# Patient Record
Sex: FEMALE | Race: WHITE | NOT HISPANIC OR LATINO | Employment: OTHER | URBAN - METROPOLITAN AREA
[De-identification: names, ages, dates, MRNs, and addresses within clinical notes are randomized per-mention and may not be internally consistent; named-entity substitution may affect disease eponyms.]

---

## 2020-12-03 ENCOUNTER — HOSPITAL ENCOUNTER (INPATIENT)
Facility: HOSPITAL | Age: 75
LOS: 2 days | End: 2020-12-06
Attending: INTERNAL MEDICINE | Admitting: INTERNAL MEDICINE

## 2020-12-03 DIAGNOSIS — R31.0 GROSS HEMATURIA: Primary | ICD-10-CM

## 2020-12-03 DIAGNOSIS — N30.91 CYSTITIS WITH HEMATURIA: ICD-10-CM

## 2020-12-03 PROBLEM — N39.0 ACUTE UTI (URINARY TRACT INFECTION): Status: ACTIVE | Noted: 2020-12-03

## 2020-12-03 LAB
ALBUMIN SERPL-MCNC: 2 G/DL (ref 3.5–5.2)
ALBUMIN/GLOB SERPL: 0.5 G/DL
ALP SERPL-CCNC: 66 U/L (ref 39–117)
ALT SERPL W P-5'-P-CCNC: 7 U/L (ref 1–33)
ANION GAP SERPL CALCULATED.3IONS-SCNC: 7 MMOL/L (ref 5–15)
AST SERPL-CCNC: 14 U/L (ref 1–32)
BACTERIA UR QL AUTO: ABNORMAL /HPF
BACTERIA UR QL AUTO: ABNORMAL /HPF
BASOPHILS # BLD AUTO: 0 10*3/MM3 (ref 0–0.2)
BASOPHILS NFR BLD AUTO: 0.3 % (ref 0–1.5)
BILIRUB SERPL-MCNC: 0.2 MG/DL (ref 0–1.2)
BILIRUB UR QL STRIP: NEGATIVE
BILIRUB UR QL STRIP: NEGATIVE
BUN SERPL-MCNC: 20 MG/DL (ref 8–23)
BUN/CREAT SERPL: 20.6 (ref 7–25)
CALCIUM SPEC-SCNC: 7.9 MG/DL (ref 8.6–10.5)
CHLORIDE SERPL-SCNC: 107 MMOL/L (ref 98–107)
CLARITY UR: ABNORMAL
CLARITY UR: ABNORMAL
CO2 SERPL-SCNC: 26 MMOL/L (ref 22–29)
COLOR UR: ABNORMAL
COLOR UR: ABNORMAL
CREAT SERPL-MCNC: 0.97 MG/DL (ref 0.57–1)
DEPRECATED RDW RBC AUTO: 55.6 FL (ref 37–54)
EOSINOPHIL # BLD AUTO: 0 10*3/MM3 (ref 0–0.4)
EOSINOPHIL NFR BLD AUTO: 0.5 % (ref 0.3–6.2)
ERYTHROCYTE [DISTWIDTH] IN BLOOD BY AUTOMATED COUNT: 19.7 % (ref 12.3–15.4)
GFR SERPL CREATININE-BSD FRML MDRD: 56 ML/MIN/1.73
GLOBULIN UR ELPH-MCNC: 4.1 GM/DL
GLUCOSE SERPL-MCNC: 141 MG/DL (ref 65–99)
GLUCOSE UR STRIP-MCNC: NEGATIVE MG/DL
GLUCOSE UR STRIP-MCNC: NEGATIVE MG/DL
HCT VFR BLD AUTO: 25.9 % (ref 34–46.6)
HGB BLD-MCNC: 7.9 G/DL (ref 12–15.9)
HGB UR QL STRIP.AUTO: ABNORMAL
HGB UR QL STRIP.AUTO: ABNORMAL
HYALINE CASTS UR QL AUTO: ABNORMAL /LPF
HYALINE CASTS UR QL AUTO: ABNORMAL /LPF
KETONES UR QL STRIP: ABNORMAL
KETONES UR QL STRIP: ABNORMAL
LEUKOCYTE ESTERASE UR QL STRIP.AUTO: ABNORMAL
LEUKOCYTE ESTERASE UR QL STRIP.AUTO: ABNORMAL
LIPASE SERPL-CCNC: 22 U/L (ref 13–60)
LYMPHOCYTES # BLD AUTO: 1.1 10*3/MM3 (ref 0.7–3.1)
LYMPHOCYTES NFR BLD AUTO: 27.4 % (ref 19.6–45.3)
MCH RBC QN AUTO: 24.6 PG (ref 26.6–33)
MCHC RBC AUTO-ENTMCNC: 30.5 G/DL (ref 31.5–35.7)
MCV RBC AUTO: 80.8 FL (ref 79–97)
MONOCYTES # BLD AUTO: 0.3 10*3/MM3 (ref 0.1–0.9)
MONOCYTES NFR BLD AUTO: 6.4 % (ref 5–12)
NEUTROPHILS NFR BLD AUTO: 2.6 10*3/MM3 (ref 1.7–7)
NEUTROPHILS NFR BLD AUTO: 65.4 % (ref 42.7–76)
NITRITE UR QL STRIP: NEGATIVE
NITRITE UR QL STRIP: NEGATIVE
NRBC BLD AUTO-RTO: 0.1 /100 WBC (ref 0–0.2)
PH UR STRIP.AUTO: 5.5 [PH] (ref 5–8)
PH UR STRIP.AUTO: 6 [PH] (ref 5–8)
PLATELET # BLD AUTO: 280 10*3/MM3 (ref 140–450)
PMV BLD AUTO: 7.2 FL (ref 6–12)
POTASSIUM SERPL-SCNC: 3.6 MMOL/L (ref 3.5–5.2)
PROT SERPL-MCNC: 6.1 G/DL (ref 6–8.5)
PROT UR QL STRIP: ABNORMAL
PROT UR QL STRIP: ABNORMAL
RBC # BLD AUTO: 3.21 10*6/MM3 (ref 3.77–5.28)
RBC # UR: ABNORMAL /HPF
RBC # UR: ABNORMAL /HPF
REF LAB TEST METHOD: ABNORMAL
REF LAB TEST METHOD: ABNORMAL
SODIUM SERPL-SCNC: 140 MMOL/L (ref 136–145)
SP GR UR STRIP: 1.02 (ref 1–1.03)
SP GR UR STRIP: 1.02 (ref 1–1.03)
SQUAMOUS #/AREA URNS HPF: ABNORMAL /HPF
SQUAMOUS #/AREA URNS HPF: ABNORMAL /HPF
UROBILINOGEN UR QL STRIP: ABNORMAL
UROBILINOGEN UR QL STRIP: ABNORMAL
WBC # BLD AUTO: 3.9 10*3/MM3 (ref 3.4–10.8)
WBC UR QL AUTO: ABNORMAL /HPF
WBC UR QL AUTO: ABNORMAL /HPF

## 2020-12-03 PROCEDURE — 99285 EMERGENCY DEPT VISIT HI MDM: CPT

## 2020-12-03 PROCEDURE — P9612 CATHETERIZE FOR URINE SPEC: HCPCS

## 2020-12-03 PROCEDURE — 87086 URINE CULTURE/COLONY COUNT: CPT | Performed by: NURSE PRACTITIONER

## 2020-12-03 PROCEDURE — 99219 PR INITIAL OBSERVATION CARE/DAY 50 MINUTES: CPT | Performed by: NURSE PRACTITIONER

## 2020-12-03 PROCEDURE — 87077 CULTURE AEROBIC IDENTIFY: CPT | Performed by: NURSE PRACTITIONER

## 2020-12-03 PROCEDURE — G0378 HOSPITAL OBSERVATION PER HR: HCPCS

## 2020-12-03 PROCEDURE — 99284 EMERGENCY DEPT VISIT MOD MDM: CPT

## 2020-12-03 PROCEDURE — 81001 URINALYSIS AUTO W/SCOPE: CPT | Performed by: NURSE PRACTITIONER

## 2020-12-03 PROCEDURE — 25010000002 CEFTRIAXONE PER 250 MG: Performed by: NURSE PRACTITIONER

## 2020-12-03 PROCEDURE — 85025 COMPLETE CBC W/AUTO DIFF WBC: CPT | Performed by: NURSE PRACTITIONER

## 2020-12-03 PROCEDURE — 83690 ASSAY OF LIPASE: CPT | Performed by: NURSE PRACTITIONER

## 2020-12-03 PROCEDURE — 87186 SC STD MICRODIL/AGAR DIL: CPT | Performed by: NURSE PRACTITIONER

## 2020-12-03 PROCEDURE — 80053 COMPREHEN METABOLIC PANEL: CPT | Performed by: NURSE PRACTITIONER

## 2020-12-03 RX ORDER — BISACODYL 10 MG
10 SUPPOSITORY, RECTAL RECTAL DAILY PRN
COMMUNITY

## 2020-12-03 RX ORDER — ONDANSETRON 4 MG/1
4 TABLET, FILM COATED ORAL EVERY 6 HOURS PRN
Status: DISCONTINUED | OUTPATIENT
Start: 2020-12-03 | End: 2020-12-06 | Stop reason: HOSPADM

## 2020-12-03 RX ORDER — QUETIAPINE FUMARATE 50 MG/1
50 TABLET, FILM COATED ORAL 2 TIMES DAILY
COMMUNITY

## 2020-12-03 RX ORDER — LORAZEPAM 2 MG/ML
1 INJECTION INTRAMUSCULAR EVERY 6 HOURS PRN
Status: DISCONTINUED | OUTPATIENT
Start: 2020-12-03 | End: 2020-12-04

## 2020-12-03 RX ORDER — SODIUM CHLORIDE 0.9 % (FLUSH) 0.9 %
10 SYRINGE (ML) INJECTION AS NEEDED
Status: DISCONTINUED | OUTPATIENT
Start: 2020-12-03 | End: 2020-12-06 | Stop reason: HOSPADM

## 2020-12-03 RX ORDER — ACETAMINOPHEN 325 MG/1
650 TABLET ORAL EVERY 4 HOURS PRN
Status: DISCONTINUED | OUTPATIENT
Start: 2020-12-03 | End: 2020-12-06 | Stop reason: HOSPADM

## 2020-12-03 RX ORDER — ALUMINA, MAGNESIA, AND SIMETHICONE 2400; 2400; 240 MG/30ML; MG/30ML; MG/30ML
15 SUSPENSION ORAL EVERY 4 HOURS PRN
Status: DISCONTINUED | OUTPATIENT
Start: 2020-12-03 | End: 2020-12-06 | Stop reason: HOSPADM

## 2020-12-03 RX ORDER — CALCIUM GLUCONATE 20 MG/ML
1 INJECTION, SOLUTION INTRAVENOUS AS NEEDED
Status: DISCONTINUED | OUTPATIENT
Start: 2020-12-03 | End: 2020-12-06 | Stop reason: HOSPADM

## 2020-12-03 RX ORDER — POLYETHYLENE GLYCOL 3350 17 G/17G
17 POWDER, FOR SOLUTION ORAL DAILY PRN
Status: DISCONTINUED | OUTPATIENT
Start: 2020-12-03 | End: 2020-12-06 | Stop reason: HOSPADM

## 2020-12-03 RX ORDER — ACETAMINOPHEN 650 MG/1
650 SUPPOSITORY RECTAL EVERY 4 HOURS PRN
Status: DISCONTINUED | OUTPATIENT
Start: 2020-12-03 | End: 2020-12-06 | Stop reason: HOSPADM

## 2020-12-03 RX ORDER — LOPERAMIDE HYDROCHLORIDE 2 MG/1
2 CAPSULE ORAL 4 TIMES DAILY PRN
Status: DISCONTINUED | OUTPATIENT
Start: 2020-12-03 | End: 2020-12-06 | Stop reason: HOSPADM

## 2020-12-03 RX ORDER — MAGNESIUM HYDROXIDE/ALUMINUM HYDROXICE/SIMETHICONE 120; 1200; 1200 MG/30ML; MG/30ML; MG/30ML
30 SUSPENSION ORAL EVERY 4 HOURS PRN
COMMUNITY

## 2020-12-03 RX ORDER — LOPERAMIDE HYDROCHLORIDE 2 MG/1
2 CAPSULE ORAL 4 TIMES DAILY PRN
COMMUNITY

## 2020-12-03 RX ORDER — ONDANSETRON 2 MG/ML
4 INJECTION INTRAMUSCULAR; INTRAVENOUS EVERY 6 HOURS PRN
Status: DISCONTINUED | OUTPATIENT
Start: 2020-12-03 | End: 2020-12-06 | Stop reason: HOSPADM

## 2020-12-03 RX ORDER — SODIUM CHLORIDE 0.9 % (FLUSH) 0.9 %
3 SYRINGE (ML) INJECTION EVERY 12 HOURS SCHEDULED
Status: DISCONTINUED | OUTPATIENT
Start: 2020-12-03 | End: 2020-12-06 | Stop reason: HOSPADM

## 2020-12-03 RX ORDER — SODIUM CHLORIDE 0.9 % (FLUSH) 0.9 %
3-10 SYRINGE (ML) INJECTION AS NEEDED
Status: DISCONTINUED | OUTPATIENT
Start: 2020-12-03 | End: 2020-12-06 | Stop reason: HOSPADM

## 2020-12-03 RX ORDER — POLYETHYLENE GLYCOL 3350 17 G/17G
17 POWDER, FOR SOLUTION ORAL DAILY PRN
COMMUNITY

## 2020-12-03 RX ORDER — QUETIAPINE FUMARATE 100 MG/1
100 TABLET, FILM COATED ORAL NIGHTLY
Status: DISCONTINUED | OUTPATIENT
Start: 2020-12-03 | End: 2020-12-06 | Stop reason: HOSPADM

## 2020-12-03 RX ORDER — ACETAMINOPHEN 160 MG/5ML
650 SOLUTION ORAL EVERY 4 HOURS PRN
Status: DISCONTINUED | OUTPATIENT
Start: 2020-12-03 | End: 2020-12-06 | Stop reason: HOSPADM

## 2020-12-03 RX ORDER — QUETIAPINE FUMARATE 25 MG/1
25 TABLET, FILM COATED ORAL EVERY 4 HOURS PRN
Status: DISCONTINUED | OUTPATIENT
Start: 2020-12-03 | End: 2020-12-06 | Stop reason: HOSPADM

## 2020-12-03 RX ORDER — ACETAMINOPHEN 325 MG/1
650 TABLET ORAL EVERY 4 HOURS PRN
COMMUNITY

## 2020-12-03 RX ORDER — BISACODYL 10 MG
10 SUPPOSITORY, RECTAL RECTAL DAILY PRN
Status: DISCONTINUED | OUTPATIENT
Start: 2020-12-03 | End: 2020-12-06 | Stop reason: HOSPADM

## 2020-12-03 RX ORDER — QUETIAPINE FUMARATE 100 MG/1
100 TABLET, FILM COATED ORAL NIGHTLY
COMMUNITY

## 2020-12-03 RX ORDER — QUETIAPINE FUMARATE 25 MG/1
25 TABLET, FILM COATED ORAL EVERY 4 HOURS PRN
COMMUNITY

## 2020-12-03 RX ORDER — QUETIAPINE FUMARATE 25 MG/1
50 TABLET, FILM COATED ORAL 2 TIMES DAILY
Status: DISCONTINUED | OUTPATIENT
Start: 2020-12-03 | End: 2020-12-04

## 2020-12-03 RX ORDER — CALCIUM GLUCONATE 20 MG/ML
2 INJECTION, SOLUTION INTRAVENOUS AS NEEDED
Status: DISCONTINUED | OUTPATIENT
Start: 2020-12-03 | End: 2020-12-06 | Stop reason: HOSPADM

## 2020-12-03 RX ADMIN — CEFTRIAXONE SODIUM 2 G: 10 INJECTION, POWDER, FOR SOLUTION INTRAVENOUS at 20:31

## 2020-12-03 RX ADMIN — SODIUM CHLORIDE 1000 ML: 0.9 INJECTION, SOLUTION INTRAVENOUS at 19:47

## 2020-12-04 LAB
ABO GROUP BLD: NORMAL
ANION GAP SERPL CALCULATED.3IONS-SCNC: 7 MMOL/L (ref 5–15)
BASOPHILS # BLD AUTO: 0 10*3/MM3 (ref 0–0.2)
BASOPHILS NFR BLD AUTO: 0.2 % (ref 0–1.5)
BLD GP AB SCN SERPL QL: NEGATIVE
BUN SERPL-MCNC: 16 MG/DL (ref 8–23)
BUN/CREAT SERPL: 19.5 (ref 7–25)
CA-I SERPL ISE-MCNC: 1.11 MMOL/L (ref 1.2–1.3)
CALCIUM SPEC-SCNC: 7.4 MG/DL (ref 8.6–10.5)
CHLORIDE SERPL-SCNC: 110 MMOL/L (ref 98–107)
CO2 SERPL-SCNC: 26 MMOL/L (ref 22–29)
CREAT SERPL-MCNC: 0.82 MG/DL (ref 0.57–1)
DEPRECATED RDW RBC AUTO: 57.3 FL (ref 37–54)
EOSINOPHIL # BLD AUTO: 0 10*3/MM3 (ref 0–0.4)
EOSINOPHIL NFR BLD AUTO: 0.1 % (ref 0.3–6.2)
ERYTHROCYTE [DISTWIDTH] IN BLOOD BY AUTOMATED COUNT: 19.7 % (ref 12.3–15.4)
GFR SERPL CREATININE-BSD FRML MDRD: 68 ML/MIN/1.73
GLUCOSE SERPL-MCNC: 121 MG/DL (ref 65–99)
HCT VFR BLD AUTO: 22.4 % (ref 34–46.6)
HCT VFR BLD AUTO: 23.7 % (ref 34–46.6)
HGB BLD-MCNC: 7 G/DL (ref 12–15.9)
HGB BLD-MCNC: 7.5 G/DL (ref 12–15.9)
LYMPHOCYTES # BLD AUTO: 1.1 10*3/MM3 (ref 0.7–3.1)
LYMPHOCYTES NFR BLD AUTO: 31.1 % (ref 19.6–45.3)
MCH RBC QN AUTO: 25.7 PG (ref 26.6–33)
MCHC RBC AUTO-ENTMCNC: 31.5 G/DL (ref 31.5–35.7)
MCV RBC AUTO: 81.7 FL (ref 79–97)
MONOCYTES # BLD AUTO: 0.3 10*3/MM3 (ref 0.1–0.9)
MONOCYTES NFR BLD AUTO: 8 % (ref 5–12)
NEUTROPHILS NFR BLD AUTO: 2.2 10*3/MM3 (ref 1.7–7)
NEUTROPHILS NFR BLD AUTO: 60.6 % (ref 42.7–76)
NRBC BLD AUTO-RTO: 0 /100 WBC (ref 0–0.2)
PLATELET # BLD AUTO: 245 10*3/MM3 (ref 140–450)
PMV BLD AUTO: 7.6 FL (ref 6–12)
POTASSIUM SERPL-SCNC: 3.3 MMOL/L (ref 3.5–5.2)
POTASSIUM SERPL-SCNC: 4 MMOL/L (ref 3.5–5.2)
POTASSIUM SERPL-SCNC: 4.1 MMOL/L (ref 3.5–5.2)
RBC # BLD AUTO: 2.9 10*6/MM3 (ref 3.77–5.28)
RH BLD: POSITIVE
SODIUM SERPL-SCNC: 143 MMOL/L (ref 136–145)
T&S EXPIRATION DATE: NORMAL
WBC # BLD AUTO: 3.6 10*3/MM3 (ref 3.4–10.8)

## 2020-12-04 PROCEDURE — 25010000002 AMPICILLIN PER 500 MG: Performed by: INTERNAL MEDICINE

## 2020-12-04 PROCEDURE — 86850 RBC ANTIBODY SCREEN: CPT | Performed by: INTERNAL MEDICINE

## 2020-12-04 PROCEDURE — 84132 ASSAY OF SERUM POTASSIUM: CPT | Performed by: INTERNAL MEDICINE

## 2020-12-04 PROCEDURE — 82330 ASSAY OF CALCIUM: CPT | Performed by: NURSE PRACTITIONER

## 2020-12-04 PROCEDURE — 86900 BLOOD TYPING SEROLOGIC ABO: CPT

## 2020-12-04 PROCEDURE — 85018 HEMOGLOBIN: CPT | Performed by: NURSE PRACTITIONER

## 2020-12-04 PROCEDURE — 25010000002 LORAZEPAM PER 2 MG: Performed by: NURSE PRACTITIONER

## 2020-12-04 PROCEDURE — 25010000002 ZIPRASIDONE MESYLATE PER 10 MG: Performed by: INTERNAL MEDICINE

## 2020-12-04 PROCEDURE — 92610 EVALUATE SWALLOWING FUNCTION: CPT

## 2020-12-04 PROCEDURE — 99233 SBSQ HOSP IP/OBS HIGH 50: CPT | Performed by: INTERNAL MEDICINE

## 2020-12-04 PROCEDURE — 25010000003 POTASSIUM CHLORIDE 10 MEQ/100ML SOLUTION: Performed by: NURSE PRACTITIONER

## 2020-12-04 PROCEDURE — 85025 COMPLETE CBC W/AUTO DIFF WBC: CPT | Performed by: NURSE PRACTITIONER

## 2020-12-04 PROCEDURE — 99221 1ST HOSP IP/OBS SF/LOW 40: CPT | Performed by: PSYCHIATRY & NEUROLOGY

## 2020-12-04 PROCEDURE — 86900 BLOOD TYPING SEROLOGIC ABO: CPT | Performed by: INTERNAL MEDICINE

## 2020-12-04 PROCEDURE — 86901 BLOOD TYPING SEROLOGIC RH(D): CPT

## 2020-12-04 PROCEDURE — 86901 BLOOD TYPING SEROLOGIC RH(D): CPT | Performed by: INTERNAL MEDICINE

## 2020-12-04 PROCEDURE — 25010000002 CEFTRIAXONE PER 250 MG: Performed by: INTERNAL MEDICINE

## 2020-12-04 PROCEDURE — 80048 BASIC METABOLIC PNL TOTAL CA: CPT | Performed by: NURSE PRACTITIONER

## 2020-12-04 PROCEDURE — 85014 HEMATOCRIT: CPT | Performed by: NURSE PRACTITIONER

## 2020-12-04 RX ORDER — OLANZAPINE 10 MG/1
5 INJECTION, POWDER, LYOPHILIZED, FOR SOLUTION INTRAMUSCULAR ONCE
Status: COMPLETED | OUTPATIENT
Start: 2020-12-04 | End: 2020-12-04

## 2020-12-04 RX ORDER — WATER 1000 ML/1000ML
INJECTION, SOLUTION INTRAVENOUS
Status: COMPLETED
Start: 2020-12-04 | End: 2020-12-04

## 2020-12-04 RX ORDER — SODIUM CHLORIDE, SODIUM LACTATE, POTASSIUM CHLORIDE, CALCIUM CHLORIDE 600; 310; 30; 20 MG/100ML; MG/100ML; MG/100ML; MG/100ML
125 INJECTION, SOLUTION INTRAVENOUS CONTINUOUS
Status: DISCONTINUED | OUTPATIENT
Start: 2020-12-04 | End: 2020-12-05

## 2020-12-04 RX ORDER — POTASSIUM CHLORIDE 7.45 MG/ML
10 INJECTION INTRAVENOUS
Status: DISCONTINUED | OUTPATIENT
Start: 2020-12-04 | End: 2020-12-06 | Stop reason: HOSPADM

## 2020-12-04 RX ORDER — OLANZAPINE 10 MG/1
5 INJECTION, POWDER, LYOPHILIZED, FOR SOLUTION INTRAMUSCULAR 2 TIMES DAILY
Status: DISCONTINUED | OUTPATIENT
Start: 2020-12-04 | End: 2020-12-04 | Stop reason: ALTCHOICE

## 2020-12-04 RX ORDER — OLANZAPINE 10 MG/1
5 INJECTION, POWDER, LYOPHILIZED, FOR SOLUTION INTRAMUSCULAR 2 TIMES DAILY PRN
Status: DISCONTINUED | OUTPATIENT
Start: 2020-12-04 | End: 2020-12-06 | Stop reason: HOSPADM

## 2020-12-04 RX ORDER — QUETIAPINE FUMARATE 25 MG/1
50 TABLET, FILM COATED ORAL 2 TIMES DAILY
Status: DISCONTINUED | OUTPATIENT
Start: 2020-12-04 | End: 2020-12-06 | Stop reason: HOSPADM

## 2020-12-04 RX ADMIN — POTASSIUM CHLORIDE 10 MEQ: 7.46 INJECTION, SOLUTION INTRAVENOUS at 12:39

## 2020-12-04 RX ADMIN — POTASSIUM CHLORIDE 10 MEQ: 7.46 INJECTION, SOLUTION INTRAVENOUS at 05:27

## 2020-12-04 RX ADMIN — OLANZAPINE 5 MG: 10 INJECTION, POWDER, FOR SOLUTION INTRAMUSCULAR at 16:00

## 2020-12-04 RX ADMIN — ZIPRASIDONE MESYLATE 10 MG: 20 INJECTION, POWDER, LYOPHILIZED, FOR SOLUTION INTRAMUSCULAR at 12:39

## 2020-12-04 RX ADMIN — SODIUM CHLORIDE, POTASSIUM CHLORIDE, SODIUM LACTATE AND CALCIUM CHLORIDE 125 ML/HR: 600; 310; 30; 20 INJECTION, SOLUTION INTRAVENOUS at 01:31

## 2020-12-04 RX ADMIN — SODIUM CHLORIDE, POTASSIUM CHLORIDE, SODIUM LACTATE AND CALCIUM CHLORIDE 125 ML/HR: 600; 310; 30; 20 INJECTION, SOLUTION INTRAVENOUS at 23:28

## 2020-12-04 RX ADMIN — POTASSIUM CHLORIDE 10 MEQ: 7.46 INJECTION, SOLUTION INTRAVENOUS at 06:34

## 2020-12-04 RX ADMIN — AMPICILLIN 1 G: 1 INJECTION, POWDER, FOR SOLUTION INTRAMUSCULAR; INTRAVENOUS at 23:28

## 2020-12-04 RX ADMIN — AMPICILLIN 1 G: 1 INJECTION, POWDER, FOR SOLUTION INTRAMUSCULAR; INTRAVENOUS at 16:00

## 2020-12-04 RX ADMIN — LORAZEPAM 1 MG: 2 INJECTION INTRAMUSCULAR; INTRAVENOUS at 01:39

## 2020-12-04 RX ADMIN — WATER 1 ML: 1 INJECTION INTRAMUSCULAR; INTRAVENOUS; SUBCUTANEOUS at 14:44

## 2020-12-04 RX ADMIN — CEFTRIAXONE SODIUM 2 G: 2 INJECTION, POWDER, FOR SOLUTION INTRAMUSCULAR; INTRAVENOUS at 21:44

## 2020-12-04 RX ADMIN — OLANZAPINE 5 MG: 10 INJECTION, POWDER, FOR SOLUTION INTRAMUSCULAR at 14:19

## 2020-12-04 RX ADMIN — LORAZEPAM 1 MG: 2 INJECTION INTRAMUSCULAR; INTRAVENOUS at 11:29

## 2020-12-04 RX ADMIN — POTASSIUM CHLORIDE 10 MEQ: 7.46 INJECTION, SOLUTION INTRAVENOUS at 10:13

## 2020-12-04 RX ADMIN — SODIUM CHLORIDE, POTASSIUM CHLORIDE, SODIUM LACTATE AND CALCIUM CHLORIDE 125 ML/HR: 600; 310; 30; 20 INJECTION, SOLUTION INTRAVENOUS at 11:55

## 2020-12-04 RX ADMIN — Medication 3 ML: at 01:24

## 2020-12-04 NOTE — H&P
[facility]      Patient Name: Haylee Blackwell  : 1945  MRN: [id number]  Primary Care Physician: Cecelia, Kisha Known  Date of admission: 12/3/2020    Patient Care Team:  Provider, No Known as PCP - General          Subjective   History Present Illness     Chief Complaint:   Chief Complaint   Patient presents with   • Blood in Urine     blood in urine,                   75 year old female brought from [facility] for hematuria. She has recent PMH of dementia with behavioral disturbance. She has been at [facility] since 20. She is awake but answers inappropriately . Per review of records from [facility] she seems to be at baseline and has PMH of combativeness. She is cooperative with exam but no information can be obtained by patient .She is afebrile , serum labs show Hgb 7.9 with no comparison, glucose 141 and Ca 7.9.  UA does show 3+ bacteria and large blood. She was started on IV Ceftriaxone in ED with urine culture pending and given 1L IVF hydration. PMH per records of arthritis and constipation. She will be admitted for further evaluation and treatment . No observed symptoms suggestive of Covid-19. She arrived with a Treviño catheter which was changed in ED.      Review of Systems   Unable to perform ROS: psychiatric disorder           Personal History     Past Medical History:   Past Medical History:   Diagnosis Date   • Dementia (CMS/HCC)    • Osteoarthritis        Surgical History:    History reviewed. No pertinent surgical history.        Family History: Family history is unknown by patient. Otherwise pertinent FHx was reviewed and unremarkable.     Social History: Alcohol use questions deferred to the physician. Drug use questions deferred to the physician.      Medications:  Prior to Admission medications    Not on File       Allergies:  No Known Allergies    Objective   Objective     Vital Signs  Temp:  [97.8 °F (36.6 °C)] 97.8 °F (36.6  °C)  Heart Rate:  [66-99] 99  Resp:  [18] 18  BP: (106-136)/(39-87) 136/87  SpO2:  [84 %-100 %] 84 %  on   ;   Device (Oxygen Therapy): room air  Body mass index is 21.17 kg/m².    Physical Exam  Vitals signs reviewed.   Constitutional:       Appearance: She is normal weight.   HENT:      Head: Normocephalic and atraumatic.      Left Ear: External ear normal.      Nose: Nose normal.      Mouth/Throat:      Mouth: Mucous membranes are moist.   Eyes:      Extraocular Movements: Extraocular movements intact.   Neck:      Musculoskeletal: Normal range of motion and neck supple.   Cardiovascular:      Rate and Rhythm: Normal rate and regular rhythm.      Pulses: Normal pulses.      Heart sounds: Normal heart sounds.   Pulmonary:      Effort: Pulmonary effort is normal.      Breath sounds: Normal breath sounds.   Abdominal:      Palpations: Abdomen is soft.   Genitourinary:     Comments: deferred  Musculoskeletal: Normal range of motion.   Skin:     General: Skin is warm and dry.   Neurological:      General: No focal deficit present.      Mental Status: She is alert. Mental status is at baseline.   Psychiatric:      Comments: Cooperative but confused          Results Review:  I have personally reviewed most recent lab results and agree with findings, most notably: UA.    Results from last 7 days   Lab Units 12/03/20  1852   WBC 10*3/mm3 3.90   HEMOGLOBIN g/dL 7.9*   HEMATOCRIT % 25.9*   PLATELETS 10*3/mm3 280     Results from last 7 days   Lab Units 12/03/20  1852   SODIUM mmol/L 140   POTASSIUM mmol/L 3.6   CHLORIDE mmol/L 107   CO2 mmol/L 26.0   BUN mg/dL 20   CREATININE mg/dL 0.97   GLUCOSE mg/dL 141*   CALCIUM mg/dL 7.9*   ALT (SGPT) U/L 7   AST (SGOT) U/L 14     Estimated Creatinine Clearance: 47.1 mL/min (by C-G formula based on SCr of 0.97 mg/dL).  Brief Urine Lab Results  (Last result in the past 365 days)      Color   Clarity   Blood   Leuk Est   Nitrite   Protein   CREAT   Urine HCG        12/03/20 1951 Dark  Yellow  Comment:  Result checked  Turbid  Comment:  Result checked  Large (3+) Large (3+) Negative 100 mg/dL (2+)               Microbiology Results (last 10 days)     ** No results found for the last 240 hours. **          ECG/EMG Results (most recent)     None                    No radiology results for the last 7 days      Estimated Creatinine Clearance: 47.1 mL/min (by C-G formula based on SCr of 0.97 mg/dL).    Assessment/Plan   Assessment/Plan       Active Hospital Problems    Diagnosis  POA   • Gross hematuria [R31.0]  Yes   • Acute UTI (urinary tract infection) [N39.0]  Yes      Resolved Hospital Problems   No resolved problems to display.     Gross hematuria likely secondary to UTI 3+ bacteria , IV Ceftriaxone continued with urine culture pending, IVF hydration Hgb 7.9 repeat cbc in am . New Treviño placed in ED, may need Urology consult or CT scan if Hgb drops.    Normocytic anemia Hgb 7.9 baseline unknown , see above    Hypocalcemia 7.9 ionized Ca pending, replacement protocol in place    Dementia with behavioral disturbance - is inpatient at [facility], on Seroquel , 1 mg Ativan q 6 hrs prn for agitation , may need inpateint psych while here if combative    Chronic constipation - continue home prn meds     Arthritis per records - on Tylenol             VTE Prophylaxis -   Mechanical Order History:      Ordered        Signed and Held  Place Sequential Compression Device  Once         Signed and Held  Maintain Sequential Compression Device  Continuous                 Pharmalogical Order History:     None          CODE STATUS:    Code Status and Medical Interventions:   Ordered at: 12/03/20 2123     Code Status:    CPR     Medical Interventions (Level of Support Prior to Arrest):    Full       This patient has been examined wearing appropriate Personal Protective Equipment  12/03/20      I discussed the patient's findings and my recommendations with patient and nursing staff.      Signature:Electronically  signed by Tasha Angel, ANMOL, 12/03/20, 11:42 PM EST.    [facility] Hospitalist Team

## 2020-12-04 NOTE — ED PROVIDER NOTES
Subjective   75-year-old  female arrives from [facility]; [facility] via EMS for evaluation of hematuria.  Patient is a very poor historian and is oriented to person only.  Patient has a history of schizophrenia and dementia.  Onset: Unknown   location: Urine  Duration: Unknown  Character: Blood in urine  Aggravating/Alleviating Factors: Unknown/none  Radiation: Unknown  Severity: Moderate            Review of Systems   Unable to perform ROS: Dementia       Past Medical History:   Diagnosis Date   • Dementia (CMS/HCC)    • Osteoarthritis        No Known Allergies    History reviewed. No pertinent surgical history.    Family History   Family history unknown: Yes       Social History     Socioeconomic History   • Marital status:      Spouse name: Not on file   • Number of children: Not on file   • Years of education: Not on file   • Highest education level: Not on file   Tobacco Use   • Smoking status: Never Smoker   • Smokeless tobacco: Never Used   • Tobacco comment: Pt unable to verify   Substance and Sexual Activity   • Alcohol use: Defer   • Drug use: Defer   • Sexual activity: Defer           Objective   Physical Exam  Constitutional:       General: She is not in acute distress.     Appearance: She is ill-appearing. She is not diaphoretic.   HENT:      Head: Normocephalic and atraumatic.      Right Ear: Tympanic membrane normal.      Left Ear: Tympanic membrane normal.      Nose: Nose normal.      Mouth/Throat:      Mouth: Mucous membranes are dry.      Pharynx: Oropharynx is clear.   Eyes:      Extraocular Movements: Extraocular movements intact.      Conjunctiva/sclera: Conjunctivae normal.      Pupils: Pupils are equal, round, and reactive to light.   Pulmonary:      Effort: Pulmonary effort is normal.      Breath sounds: Normal breath sounds.   Neurological:      Mental Status: She is alert.         Procedures           ED Course        PIV established and Hudson  cath discontinued per my verbal order and replaced with new sterile Treviño per my verbal order and U/a obtained from new Treviño administration, show marked hematuria and UTI.  Rocephin ordered and given via PIV.  1 L NS bolus ordered and given.  Admit for UTI, dehydration.                                           MDM    Final diagnoses:   Gross hematuria   Cystitis with hematuria            Nina Veras, APRN  12/03/20 2208       Nina Veras, APRN  12/14/20 0122

## 2020-12-04 NOTE — CONSULTS
"  Referring Provider: Dr De La Cruz  Reason for Consultation: agitation       Chief complaint \"help! Help! Help!\"     Subjective .     History of present illness:  The patient is a 75 y.o. female who was admitted from [facility]  secondary to hematuria. PMH: dementia, osteoarthritis.   Psych consult was requested by Dr Jono parisi to agitation .  The patient was unable to provide any information secondary significant cognitive decline and agitation, patient was in bed, yelling \"help:, Did not respond for redirections, unable to answer any questions.   Past psychiatric history: Dementia      Review of Systems   Review of systems could not be obtained due to   patient confusion.    History    Past Medical History:   Diagnosis Date   • Dementia (CMS/[facility])    • Osteoarthritis           Family History   Family history unknown: Yes   Family history: Unavailable at present time secondary to confusion     Social History     Tobacco Use   • Smoking status: Never Smoker   • Smokeless tobacco: Never Used   • Tobacco comment: Pt unable to verify   Substance Use Topics   • Alcohol use: Defer   • Drug use: Defer          Medications Prior to Admission   Medication Sig Dispense Refill Last Dose   • acetaminophen (TYLENOL) 325 MG tablet Take 650 mg by mouth Every 4 (Four) Hours As Needed for Mild Pain , Headache or Fever.      • aluminum-magnesium hydroxide-simethicone (MAALOX/MYLANTA) 200-200-20 MG/5ML suspension Take 30 mL by mouth Every 4 (Four) Hours As Needed for Indigestion or Heartburn.      • bisacodyl (DULCOLAX) 10 MG suppository Insert 10 mg into the rectum Daily As Needed for Constipation.      • loperamide (IMODIUM) 2 MG capsule Take 2 mg by mouth 4 (Four) Times a Day As Needed for Diarrhea.      • magnesium hydroxide (MILK OF MAGNESIA) 400 MG/5ML suspension Take 30 mL by mouth Daily As Needed for Constipation.      • polyethylene glycol (MIRALAX) 17 g packet Take 17 g by mouth Daily As Needed.      • QUEtiapine (SEROquel) " "100 MG tablet Take 100 mg by mouth Every Night.      • QUEtiapine (SEROquel) 25 MG tablet Take 25 mg by mouth Every 4 (Four) Hours As Needed (aggitation).      • QUEtiapine (SEROquel) 50 MG tablet Take 50 mg by mouth 2 (Two) Times a Day. Morning and Afternoon           Scheduled Meds:  ampicillin, 1 g, Intravenous, Q6H  cefTRIAXone, 2 g, Intravenous, Q24H  QUEtiapine, 100 mg, Oral, Nightly  QUEtiapine, 50 mg, Oral, BID  sodium chloride, 3 mL, Intravenous, Q12H         Continuous Infusions:  lactated ringers, 125 mL/hr, Last Rate: 125 mL/hr (12/04/20 1155)        PRN Meds:  •  acetaminophen **OR** acetaminophen **OR** acetaminophen  •  acetaminophen  •  aluminum-magnesium hydroxide-simethicone  •  bisacodyl  •  Calcium Gluconate-NaCl **AND** calcium gluconate **AND** Calcium, Ionized  •  loperamide  •  magnesium hydroxide  •  ondansetron **OR** ondansetron  •  polyethylene glycol  •  potassium chloride  •  QUEtiapine  •  [COMPLETED] Insert peripheral IV **AND** sodium chloride  •  sodium chloride  •  ziprasidone (GEODON) injection with sterile water      Allergies:  Patient has no known allergies.      Objective     Vital Signs   /66 (BP Location: Right arm, Patient Position: Lying)   Pulse 82   Temp 97.8 °F (36.6 °C) (Oral)   Resp 18   Ht 165.1 cm (65\")   Wt 57.7 kg (127 lb 3.3 oz)   SpO2 96%   Breastfeeding No   BMI 21.17 kg/m²     Physical Exam:     General Appearance:    Confused, agitated    Head:    Normocephalic, without obvious abnormality, atraumatic                   Mental Status Exam:    Hygiene:   fair  Cooperation:  uncooeprative and agitated   Eye Contact:  Poor  Psychomotor Behavior:  Aggitated  Affect:  labile   Speech:  Poor articulated, yelling   Thought Progress:  Unable to demonstrate  Thought Content:  Unable to demonstrate  Suicidal:  did not express   Homicidal:  None  Hallucinations:  Not demonstrated today  Delusion:  Unable to demonstrate  Memory:  Unable to " evaluate  Orientation:  Unable to evaluate  Reliability:  poor  Insight:  None  Judgement:  Impaired  Impulse Control:  Impaired  Physical/Medical Issues:  Yes      Medications and allergies reviewed     Lab Results   Component Value Date    GLUCOSE 121 (H) 12/04/2020    CALCIUM 7.4 (L) 12/04/2020     12/04/2020    K 4.1 12/04/2020    CO2 26.0 12/04/2020     (H) 12/04/2020    BUN 16 12/04/2020    CREATININE 0.82 12/04/2020    EGFRIFNONA 68 12/04/2020    BCR 19.5 12/04/2020    ANIONGAP 7.0 12/04/2020       Last Urine Toxicity     There is no flowsheet data to display.          No results found for: PHENYTOIN, PHENOBARB, VALPROATE, CBMZ    Lab Results   Component Value Date     12/04/2020    BUN 16 12/04/2020    CREATININE 0.82 12/04/2020    WBC 3.60 12/04/2020       Brief Urine Lab Results  (Last result in the past 365 days)      Color   Clarity   Blood   Leuk Est   Nitrite   Protein   CREAT   Urine HCG        12/03/20 1951 Dark Yellow  Comment:  Result checked  Turbid  Comment:  Result checked  Large (3+) Large (3+) Negative 100 mg/dL (2+)               Assessment/Plan       Gross hematuria    Acute UTI (urinary tract infection)       Assessment: Delirium secondary to medical condition (UTI)  Dementia with behavioral disturbances  Treatment Plan: The patient is agitated, confused, continue Seroquel on current dose, the patient did not respond to the Geodon and lorazepam, discontinue lorazepam, start Zyprexa 5 mg IM twice a day as needed for agitation  Continue to provide support  Treatment Plan discussed with: nursing     I discussed the patients findings and my recommendations with nursing staff    I have reviewed and approved the behavioral health treatment plans and problem list. Yes  Thank you for the consult   Referring MD has access to consult report and progress notes in EMR     Carol Feliz MD  12/04/20  14:45 EST

## 2020-12-04 NOTE — PROGRESS NOTES
Continued Stay Note  HAILEY Davey     Patient Name: Haylee Blackwell  MRN: [id number]  Today's Date: 12/4/2020    Admit Date: 12/3/2020    Discharge Plan     Row Name 12/04/20 8070       Plan    Plan  DC Plan: Pending clinical course and discussion with guardian. Pt is from [facility] since 11/17/2020. Prior to that pt was long term care resident at [facility] ([address], [place] #[phone]).    Plan Comments  SW attempted x2 phone call to pt list guardian. Two VM left. SW asking [facility] for additional contact information if possible. SW spoke to [facility] DON (Pt was resident at [facility] since August 2020. Gave same phone number as on file for guardian. States pt has reoccuring UTI's for them and goes to hospital often for this and it is cleared up and then comes back. Pt has been to psych prior to this admission. [facility] said that [facility] called them and told them that the pt needs to return to them again for psych care and they needs to finish her assessment. CHELO questioned as pt has been with [facility] since 11/17/2020. Inquiry as to why now they want to do more medication changes and then send back in 72 hrs. Discussed concerns surrounding pt swallow abilities.) Barriers to DC: psych consult, pt only clear for clear liquid diet.        Discharge Codes    No documentation.             BIANCA Frances

## 2020-12-04 NOTE — PLAN OF CARE
Goal Outcome Evaluation:  Plan of Care Reviewed With: patient  Progress: no change  Outcome Summary: Pt participated in limited clinical swallow eval d/t failing swallow screen. RN reported pt agitated and combative when awake but cleared pt for eval since she does not have nutrition at this time. Pt easily aroused and also easily agitated, attempting to pull at lines/tubes with difficulty redirecting. Pt accepted x1 trial water via cup and x5 trials water via straw. Anterior spillage with water via cup. Pt able to successfully drink from straw. No overt s/s of aspiration with any trial. Unable to assess solids due to refusal; therefore unable to make recommendations regarding solids. REC: clear liquid diet, meds as able. RN to discuss switching meds to IV with MD. Will f/u for re-eval and solid advancement as appropriate.

## 2020-12-04 NOTE — THERAPY EVALUATION
Acute Care - Speech Language Pathology   Swallow Initial Evaluation  [facility]     Patient Name: Haylee Blackwell  : 1945  MRN: [id number]  Today's Date: 2020               Admit Date: 12/3/2020    Visit Dx:     ICD-10-CM ICD-9-CM   1. Gross hematuria  R31.0 599.71   2. Cystitis with hematuria  N30.91 595.9     Patient Active Problem List   Diagnosis   • Gross hematuria   • Acute UTI (urinary tract infection)     Past Medical History:   Diagnosis Date   • Dementia (CMS/HCC)    • Osteoarthritis      History reviewed. No pertinent surgical history.     SWALLOW EVALUATION (last 72 hours)      SLP Adult Swallow Evaluation     Row Name 20 1600          Document Type  evaluation  -MF    Patient/Family/Caregiver Comments/Observations  Pt sleeping in bed but easily aroused. Pt easily agitated and had difficulty responding to redirection. She constantly attempted to remove tubes and lines and asked the SLP to take off her O2 sensor and F/C. Pt constantly yelling out which was also reported in other notes.   -MF    Patient Effort  fair  -MF          Patient Profile Reviewed  yes  -MF    Pertinent History Of Current Problem  Pt is a 75 year old female brought from [facility] for hematuria. She has recent PMH of dementia with behavioral disturbance. She has been at [facility] since 20. She is awake but answers inappropriately . Per review of records from [facility] she seems to be at baseline and has PMH of combativeness.  -MF    Current Method of Nutrition  NPO  -MF    Prior Level of Function-Swallowing  unknown  -MF    Plans/Goals Discussed with  patient  -MF    Barriers to Rehab  cognitive status  -MF          Additional Documentation  Pain Scale: FACES Pre/Post-Treatment (Group)  -          Pain: FACES Scale, Pretreatment  0-->no hurt  -MF    Posttreatment Pain Rating  0-->no hurt  -MF          Dentition Assessment  missing teeth  -MF    Secretion Management  WNL/WFL  -MF    Mucosal  Quality  moist, healthy  -          Oral Motor General Assessment  unable to assess  -          Respiratory Support Currently in Use  nasal cannula  -    Eating/Swallowing Skills  fed by SLP  -    Positioning During Eating  upright 90 degree;upright in bed  -    Utensils Used  cup;straw  -    Consistencies Trialed  thin liquids  -          Respiratory Status  WFL  -MF          Oral Prep Phase  impaired  -    Oral Transit  WFL  -MF    Oral Residue  WFL  -MF    Pharyngeal Phase  no overt signs/symptoms of pharyngeal impairment  -    Clinical Swallow Evaluation Summary  Pt participated in limited clinical swallow eval d/t failing swallow screen. RN reported pt agitated and combative when awake but cleared pt for eval since she does not have nutrition at this time. Pt easily aroused and also easily agitated, attempting to pull at lines/tubes with difficulty redirecting. Pt accepted x1 trial water via cup and x5 trials water via straw. Anterior spillage with water via cup. Pt able to successfully drink from straw. No overt s/s of aspiration with any trial. Unable to assess solids due to refusal; therefore unable to make recommendations regarding solids. REC: clear liquid diet, meds as able. RN to discuss switching meds to IV with MD. Will f/u for re-eval and solid advancement as appropriate.  -          Oral Prep Concerns  anterior loss  -    Oral Prep Concerns, Comment  thin by cup  -          SLP Swallowing Diagnosis  functional pharyngeal phase  -    Functional Impact  risk of aspiration/pneumonia;risk of malnutrition;risk of dehydration  -    Rehab Potential/Prognosis, Swallowing  adequate, monitor progress closely  -    Swallow Criteria for Skilled Therapeutic Interventions Met  demonstrates skilled criteria  -          Therapy Frequency (Swallow)  PRN  -    Predicted Duration Therapy Intervention (Days)  until discharge  -    SLP Diet Recommendation  clear liquid diet  -     Recommended Diagnostics  reassess via clinical swallow evaluation  -    Recommended Precautions and Strategies  upright posture during/after eating;small bites of food and sips of liquid  -    Oral Care Recommendations  Oral Care BID/PRN  -    SLP Rec. for Method of Medication Administration  as tolerated  -    Monitor for Signs of Aspiration  yes;notify SLP if any concerns  -    Anticipated Discharge Disposition (SLP)  [facility] nursing [facility]  -          Oral Nutrition/Hydration Goal Selection (SLP)  oral nutrition/hydration, SLP goal 1;oral nutrition/hydration, SLP goal 2  -          Oral Nutrition/Hydration Goal 1, SLP  Pt will tolerate her safest and least restrictive diet without complications from aspiration.   -    Time Frame (Oral Nutrition/Hydration Goal 1, SLP)  by discharge  -          Oral Nutrition/Hydration Goal 2, SLP  Pt will participate in clinical swallow re-eval to determine pt's safest/least restrictive diet.   -    Time Frame (Oral Nutrition/Hydration Goal 2, SLP)  3 days  -      User Key  (r) = Recorded By, (t) = Taken By, (c) = Cosigned By    Initials Name Effective Dates     Dulce rGaff SLP 06/17/19 -           EDUCATION  The patient has been educated in the following areas:   Modified Diet Instruction.    SLP Recommendation and Plan  SLP Swallowing Diagnosis: functional pharyngeal phase  SLP Diet Recommendation: clear liquid diet  Recommended Precautions and Strategies: upright posture during/after eating, small bites of food and sips of liquid  SLP Rec. for Method of Medication Administration: as tolerated     Monitor for Signs of Aspiration: yes, notify SLP if any concerns  Recommended Diagnostics: reassess via clinical swallow evaluation  Swallow Criteria for Skilled Therapeutic Interventions Met: demonstrates skilled criteria  Anticipated Discharge Disposition (SLP): [facility] nursing facility  Rehab Potential/Prognosis, Swallowing: adequate, monitor progress  closely  Therapy Frequency (Swallow): PRN  Predicted Duration Therapy Intervention (Days): until discharge                         Plan of Care Reviewed With: patient  Outcome Summary: Pt participated in limited clinical swallow eval d/t failing swallow screen. RN reported pt agitated and combative when awake but cleared pt for eval since she does not have nutrition at this time. Pt easily aroused and also easily agitated, attempting to pull at lines/tubes with difficulty redirecting. Pt accepted x1 trial water via cup and x5 trials water via straw. Anterior spillage with water via cup. Pt able to successfully drink from straw. No overt s/s of aspiration with any trial. Unable to assess solids due to refusal; therefore unable to make recommendations regarding solids. REC: clear liquid diet, meds as able. RN to discuss switching meds to IV with MD. Will f/u for re-eval and solid advancement as appropriate.    SLP GOALS     Row Name 12/04/20 1600             Oral Nutrition/Hydration Goal 1 (SLP)    Oral Nutrition/Hydration Goal 1, SLP  Pt will tolerate her safest and least restrictive diet without complications from aspiration.   -      Time Frame (Oral Nutrition/Hydration Goal 1, SLP)  by discharge  -         Oral Nutrition/Hydration Goal 2 (SLP)    Oral Nutrition/Hydration Goal 2, SLP  Pt will participate in clinical swallow re-eval to determine pt's safest/least restrictive diet.   -      Time Frame (Oral Nutrition/Hydration Goal 2, SLP)  3 days  -        User Key  (r) = Recorded By, (t) = Taken By, (c) = Cosigned By    Initials Name Provider Type    Dulce Mendoza SLP Speech and Language Pathologist         Patient was not wearing a face mask during this therapy encounter. Therapist used appropriate personal protective equipment including mask, eye protection and gloves.  Mask used was standard procedure mask. Appropriate PPE was worn during the entire therapy session. Hand hygiene was completed  before and after therapy session. Patient is not in enhanced droplet precautions.            Time Calculation:       Therapy Charges for Today     Code Description Service Date Service Provider Modifiers Qty    [id number] HC ST EVAL ORAL PHARYNG SWALLOW 4 12/4/2020 Dulce Graff SLP GN 1               MIKALA Sanders  12/4/2020   Statement Selected

## 2020-12-04 NOTE — PLAN OF CARE
Goal Outcome Evaluation:  Plan of Care Reviewed With: patient  Progress: no change   Patient transferred from [facility] [place] with F/C in place, no known medical reason for F/C, ER changed FC to a clean sterile FC per note.  Patient is very confused and hollars out often.  Failed bedside dysphagia study, NPO orders placed as well as speech therapy consult. Will continue to monitor.  Hospitalists Tasha notified of failed dysphagia screen

## 2020-12-04 NOTE — CONSULTS
[facility] CONSULT      Patient Identification:  NAME:  Haylee Blackwell  Age:  75 y.o.   Sex:  female   :  1945   MRN:  [id number]       Chief complaint/Reason for consult: UTI, hematuria    History of present illness:  75 y.o. female with significant history of dementia and schizophrenia who is combative and agitated.  She was admitted to the hospital for UTI with hematuria.  Treviño was changed yesterday.  We are consulted for anemia with associated hematuria.  Urine however per nursing is look the same since admission pink in bag cloudy in tubing.  She is unable to provide history due to her current mental state.  Baseline hemoglobin is around 8 in August.  Now down to 7.0 from 7.5.      Past medical history:  Past Medical History:   Diagnosis Date   • Dementia (CMS/[facility])    • Osteoarthritis        Past surgical history:  History reviewed. No pertinent surgical history.    Allergies:  Patient has no known allergies.    Home medications:  Medications Prior to Admission   Medication Sig Dispense Refill Last Dose   • acetaminophen (TYLENOL) 325 MG tablet Take 650 mg by mouth Every 4 (Four) Hours As Needed for Mild Pain , Headache or Fever.      • aluminum-magnesium hydroxide-simethicone (MAALOX/MYLANTA) 200-200-20 MG/5ML suspension Take 30 mL by mouth Every 4 (Four) Hours As Needed for Indigestion or Heartburn.      • bisacodyl (DULCOLAX) 10 MG suppository Insert 10 mg into the rectum Daily As Needed for Constipation.      • loperamide (IMODIUM) 2 MG capsule Take 2 mg by mouth 4 (Four) Times a Day As Needed for Diarrhea.      • magnesium hydroxide (MILK OF MAGNESIA) 400 MG/5ML suspension Take 30 mL by mouth Daily As Needed for Constipation.      • polyethylene glycol (MIRALAX) 17 g packet Take 17 g by mouth Daily As Needed.      • QUEtiapine (SEROquel) 100 MG tablet Take 100 mg by mouth Every Night.      • QUEtiapine (SEROquel) 25 MG tablet Take 25 mg by mouth Every 4 (Four) Hours As Needed (aggitation).       • QUEtiapine (SEROquel) 50 MG tablet Take 50 mg by mouth 2 (Two) Times a Day. Morning and Afternoon           Hospital medications:  cefTRIAXone, 2 g, Intravenous, Q24H  QUEtiapine, 100 mg, Oral, Nightly  QUEtiapine, 50 mg, Oral, BID  sodium chloride, 3 mL, Intravenous, Q12H      lactated ringers, 125 mL/hr, Last Rate: 125 mL/hr (20 1155)      •  acetaminophen **OR** acetaminophen **OR** acetaminophen  •  acetaminophen  •  aluminum-magnesium hydroxide-simethicone  •  bisacodyl  •  Calcium Gluconate-NaCl **AND** calcium gluconate **AND** Calcium, Ionized  •  loperamide  •  LORazepam  •  magnesium hydroxide  •  ondansetron **OR** ondansetron  •  polyethylene glycol  •  potassium chloride  •  QUEtiapine  •  [COMPLETED] Insert peripheral IV **AND** sodium chloride  •  sodium chloride  •  ziprasidone (GEODON) injection with sterile water    Family history:  Family History   Family history unknown: Yes       Social history:  Social History     Tobacco Use   • Smoking status: Never Smoker   • Smokeless tobacco: Never Used   • Tobacco comment: Pt unable to verify   Substance Use Topics   • Alcohol use: Defer   • Drug use: Defer       REVIEW OF SYSTEMS:  Patient unable to provide review of systems due to mental state    Objective:  TMax 24 hours:   Temp (24hrs), Av.3 °F (36.8 °C), Min:97.7 °F (36.5 °C), Max:99.9 °F (37.7 °C)      Vitals Ranges:   Temp:  [97.7 °F (36.5 °C)-99.9 °F (37.7 °C)] 97.7 °F (36.5 °C)  Heart Rate:  [66-99] 76  Resp:  [16-18] 16  BP: (106-137)/(39-87) 128/62    Intake/Output Last 3 shifts:  I/O last 3 completed shifts:  In: -   Out: 350 [Urine:350]     Physical Exam:    General Appearance:   Agitated and combative   HEENT:    No trauma, pupils reactive, hearing intact   Back:    No deformity   Lungs:     Respirations unlabored, no wheezing    Heart:    No cyanosis, No significant edema   Abdomen:     Soft, ND   :   Treviño with cloudy urine in tubing, pink in bag, no clots or significant  hematuria   Extremities:   No edema, no deformity   Lymphatic:   No neck or groin LAD   Skin:   No bleeding, bruising or rashes   Neuro/Psych:  Agitated and combative       Results review:   I reviewed the patient's new clinical results.    Data review:  Lab Results (last 24 hours)     Procedure Component Value Units Date/Time    Potassium [[id number]] Collected: 12/04/20 1253    Specimen: Blood Updated: 12/04/20 1257    Hemoglobin & Hematocrit, Blood [[id number]]  (Abnormal) Collected: 12/04/20 1104    Specimen: Blood Updated: 12/04/20 1143     Hemoglobin 7.0 g/dL      Hematocrit 22.4 %     Urine Culture - Urine, Urine, Clean Catch [[id number]]  (Normal) Collected: 12/03/20 1837    Specimen: Urine, Clean Catch Updated: 12/04/20 1022     Urine Culture Growth present, too young to evaluate    Urine Culture - Urine, Urine, Catheter [[id number]]  (Normal) Collected: 12/03/20 1951    Specimen: Urine, Catheter Updated: 12/04/20 1022     Urine Culture Culture in progress    Basic Metabolic Panel [[id number]]  (Abnormal) Collected: 12/04/20 0231    Specimen: Blood Updated: 12/04/20 0316     Glucose 121 mg/dL      BUN 16 mg/dL      Creatinine 0.82 mg/dL      Sodium 143 mmol/L      Potassium 3.3 mmol/L      Chloride 110 mmol/L      CO2 26.0 mmol/L      Calcium 7.4 mg/dL      eGFR Non African Amer 68 mL/min/1.73      BUN/Creatinine Ratio 19.5     Anion Gap 7.0 mmol/L     Narrative:      GFR Normal >60  Chronic Kidney Disease <60  Kidney Failure <15      Calcium, Ionized [[id number]]  (Abnormal) Collected: 12/04/20 0231    Specimen: Blood Updated: 12/04/20 0256     Ionized Calcium 1.11 mmol/L     CBC & Differential [[id number]]  (Abnormal) Collected: 12/04/20 0231    Specimen: Blood Updated: 12/04/20 0254    Narrative:      The following orders were created for panel order CBC & Differential.  Procedure                               Abnormality         Status                     ---------                               -----------          ------                     CBC Auto Differential[[id number]]        Abnormal            Final result                 Please view results for these tests on the individual orders.    CBC Auto Differential [[id number]]  (Abnormal) Collected: 12/04/20 0231    Specimen: Blood Updated: 12/04/20 0254     WBC 3.60 10*3/mm3      RBC 2.90 10*6/mm3      Hemoglobin 7.5 g/dL      Hematocrit 23.7 %      MCV 81.7 fL      MCH 25.7 pg      MCHC 31.5 g/dL      RDW 19.7 %      RDW-SD 57.3 fl      MPV 7.6 fL      Platelets 245 10*3/mm3      Neutrophil % 60.6 %      Lymphocyte % 31.1 %      Monocyte % 8.0 %      Eosinophil % 0.1 %      Basophil % 0.2 %      Neutrophils, Absolute 2.20 10*3/mm3      Lymphocytes, Absolute 1.10 10*3/mm3      Monocytes, Absolute 0.30 10*3/mm3      Eosinophils, Absolute 0.00 10*3/mm3      Basophils, Absolute 0.00 10*3/mm3      nRBC 0.0 /100 WBC     Urinalysis, Microscopic Only - Urine, Catheter [[id number]]  (Abnormal) Collected: 12/03/20 1951    Specimen: Urine, Catheter Updated: 12/03/20 2027     RBC, UA Too Numerous to Count /HPF      WBC, UA Too Numerous to Count /HPF      Bacteria, UA 3+ /HPF      Squamous Epithelial Cells, UA None Seen /HPF      Hyaline Casts, UA None Seen /LPF      Methodology Manual Light Microscopy    Urinalysis With Culture If Indicated - Urine, Catheter [[id number]]  (Abnormal) Collected: 12/03/20 1951    Specimen: Urine, Catheter Updated: 12/03/20 2008     Color, UA Dark Yellow     Comment: Result checked         Appearance, UA Turbid     Comment: Result checked         pH, UA 6.0     Specific Gravity, UA 1.019     Glucose, UA Negative     Ketones, UA Trace     Bilirubin, UA Negative     Blood, UA Large (3+)     Protein,  mg/dL (2+)     Leuk Esterase, UA Large (3+)     Nitrite, UA Negative     Urobilinogen, UA 1.0 E.U./dL    Urinalysis, Microscopic Only - Urine, Clean Catch [[id number]]  (Abnormal) Collected: 12/03/20 1837    Specimen: Urine, Clean Catch Updated: 12/03/20  1941     RBC, UA Too Numerous to Count /HPF      WBC, UA Too Numerous to Count /HPF      Bacteria, UA 3+ /HPF      Squamous Epithelial Cells, UA None Seen /HPF      Hyaline Casts, UA None Seen /LPF      Methodology Manual Light Microscopy    Comprehensive Metabolic Panel [[id number]]  (Abnormal) Collected: 12/03/20 1852    Specimen: Blood Updated: 12/03/20 1930     Glucose 141 mg/dL      BUN 20 mg/dL      Creatinine 0.97 mg/dL      Sodium 140 mmol/L      Potassium 3.6 mmol/L      Comment: Slight hemolysis detected by analyzer. Results may be affected.        Chloride 107 mmol/L      CO2 26.0 mmol/L      Calcium 7.9 mg/dL      Total Protein 6.1 g/dL      Albumin 2.00 g/dL      ALT (SGPT) 7 U/L      AST (SGOT) 14 U/L      Alkaline Phosphatase 66 U/L      Total Bilirubin 0.2 mg/dL      eGFR Non African Amer 56 mL/min/1.73      Globulin 4.1 gm/dL      A/G Ratio 0.5 g/dL      BUN/Creatinine Ratio 20.6     Anion Gap 7.0 mmol/L     Narrative:      GFR Normal >60  Chronic Kidney Disease <60  Kidney Failure <15      Lipase [[id number]]  (Normal) Collected: 12/03/20 1852    Specimen: Blood Updated: 12/03/20 1919     Lipase 22 U/L     CBC & Differential [[id number]]  (Abnormal) Collected: 12/03/20 1852    Specimen: Blood Updated: 12/03/20 1858    Narrative:      The following orders were created for panel order CBC & Differential.  Procedure                               Abnormality         Status                     ---------                               -----------         ------                     CBC Auto Differential[[id number]]        Abnormal            Final result                 Please view results for these tests on the individual orders.    CBC Auto Differential [[id number]]  (Abnormal) Collected: 12/03/20 1852    Specimen: Blood Updated: 12/03/20 1858     WBC 3.90 10*3/mm3      RBC 3.21 10*6/mm3      Hemoglobin 7.9 g/dL      Hematocrit 25.9 %      MCV 80.8 fL      MCH 24.6 pg      MCHC 30.5 g/dL      RDW 19.7 %       RDW-SD 55.6 fl      MPV 7.2 fL      Platelets 280 10*3/mm3      Neutrophil % 65.4 %      Lymphocyte % 27.4 %      Monocyte % 6.4 %      Eosinophil % 0.5 %      Basophil % 0.3 %      Neutrophils, Absolute 2.60 10*3/mm3      Lymphocytes, Absolute 1.10 10*3/mm3      Monocytes, Absolute 0.30 10*3/mm3      Eosinophils, Absolute 0.00 10*3/mm3      Basophils, Absolute 0.00 10*3/mm3      nRBC 0.1 /100 WBC     Urinalysis With Culture If Indicated - Urine, Clean Catch [[id number]]  (Abnormal) Collected: 12/03/20 1837    Specimen: Urine, Clean Catch Updated: 12/03/20 1858     Color, UA Orange     Comment: Result checked         Appearance, UA Turbid     Comment: Result checked        pH, UA 5.5     Specific Gravity, UA 1.018     Glucose, UA Negative     Ketones, UA Trace     Bilirubin, UA Negative     Blood, UA Large (3+)     Protein,  mg/dL (2+)     Leuk Esterase, UA Large (3+)     Nitrite, UA Negative     Urobilinogen, UA 1.0 E.U./dL           Imaging:  Imaging Results (Last 24 Hours)     ** No results found for the last 24 hours. **             Assessment:       Gross hematuria    Acute UTI (urinary tract infection)      Plan:     Treviño draining cloudy urine, no significant hematuria  Baseline hemoglobin is around 8 now down to 7, do not suspect hematuria as major contributing component given urine is clear in tubing just cloudy from infection and bag is pink  Given presence of UTI would suggest treat UTI and if still has hematuria proceed with full work-up, given patient's agitated and combative state imaging and cystoscopy even as inpatient would be difficult    Jorge Kruger MD  [facility]  [address], [address]  [place] [address]  [phone]  12/04/20  13:02 EST

## 2020-12-04 NOTE — ED NOTES
Pt oanh. Wants to be warmed. Blankets relaid against her although she was fully covered up. Pt's skin is all warm to the touch.      Lisa Ahumada RN  12/03/20 2043

## 2020-12-04 NOTE — PROGRESS NOTES
"      [facility] Medicine Services Daily Progress Note          Hospitalist Team  LOS 0 days      Patient Care Team:  Provider, No Known as PCP - General    Patient Location: 4104/1      Subjective   Subjective     Chief Complaint / Subjective  Chief Complaint   Patient presents with   • Blood in Urine     blood in urine,          Brief Synopsis of Hospital Course/HPI  75 year old female brought from [facility] for hematuria. She has recent PMH of dementia with behavioral disturbance. She has been at [facility] since 11/17/20. She is awake but answers inappropriately . Per review of records from [facility] she seems to be at baseline and has PMH of combativeness. She is cooperative with exam but no information can be obtained by patient .She is afebrile , serum labs show Hgb 7.9 with no comparison, glucose 141 and Ca 7.9.  UA does show 3+ bacteria and large blood. She was started on IV Ceftriaxone in ED with urine culture pending and given 1L IVF hydration. PMH per records of arthritis and constipation. She will be admitted for further evaluation and treatment . No observed symptoms suggestive of Covid-19. She arrived with a Treviño catheter which was changed in ED.    Date::    12/4/2020  Pt seen and examined. She is obtunded and incoherent, unable to provide any meaningful interaction.      Review of Systems   Unable to perform ROS: psychiatric disorder         Objective   Objective      Vital Signs  Temp:  [97.7 °F (36.5 °C)-99.9 °F (37.7 °C)] 97.8 °F (36.6 °C)  Heart Rate:  [66-99] 82  Resp:  [14-18] 18  BP: (106-137)/(39-87) 132/66  Oxygen Therapy  SpO2: 96 %  Pulse Oximetry Type: Continuous  Device (Oxygen Therapy): nasal cannula  Flow (L/min): 2  Flowsheet Rows      First Filed Value   Admission Height  165.1 cm (65\") Documented at 12/03/2020 1750   Admission Weight  57.7 kg (127 lb 3.3 oz) Documented at 12/03/2020 1750        Intake & Output (last 3 days)       12/01 0701 - " 12/02 0700 12/02 0701 - 12/03 0700 12/03 0701 - 12/04 0700 12/04 0701 - 12/05 0700    I.V. (mL/kg)    1186 (20.6)    Total Intake(mL/kg)    1186 (20.6)    Urine (mL/kg/hr)   350 500 (1)    Total Output   350 500    Net   -350 +686                Lines, Drains & Airways    Active LDAs     Name:   Placement date:   Placement time:   Site:   Days:    Peripheral IV 12/03/20 1859 Right Antecubital   12/03/20 1859    Antecubital   less than 1    Urethral Catheter   12/04/20    0030     less than 1                  Physical Exam:  General: well-developed and well-nourished, NAD  HEENT: NC/AT, EOMI, PERRLA  Heart: RRR. No murmur   Chest: CTAB, no w/r/r, normal respiratory effort  Abdominal: Soft. NT/ND. Bowel sounds present  Musculoskeletal: Normal ROM.  No edema. No calf tenderness.  Neurological: obtunded, incoherent  Skin: Skin is warm and dry. No rash  Psychiatric: confused, agitated        Procedures:           Results Review:     I reviewed the patient's new clinical results.    Results from last 7 days   Lab Units 12/04/20  1104 12/04/20  0231 12/03/20  1852   WBC 10*3/mm3  --  3.60 3.90   HEMOGLOBIN g/dL 7.0* 7.5* 7.9*   HEMATOCRIT % 22.4* 23.7* 25.9*   PLATELETS 10*3/mm3  --  245 280     Results from last 7 days   Lab Units 12/04/20  1253 12/04/20  0231 12/03/20  1852   SODIUM mmol/L  --  143 140   POTASSIUM mmol/L 4.1 3.3* 3.6   CHLORIDE mmol/L  --  110* 107   CO2 mmol/L  --  26.0 26.0   BUN mg/dL  --  16 20   CREATININE mg/dL  --  0.82 0.97   GLUCOSE mg/dL  --  121* 141*   ALBUMIN g/dL  --   --  2.00*   BILIRUBIN mg/dL  --   --  0.2   ALK PHOS U/L  --   --  66   AST (SGOT) U/L  --   --  14   ALT (SGPT) U/L  --   --  7   LIPASE U/L  --   --  22   CALCIUM mg/dL  --  7.4* 7.9*     Cr Clearance Estimated Creatinine Clearance: 55.7 mL/min (by C-G formula based on SCr of 0.82 mg/dL).    Coag       HbA1C No results found for: HGBA1C  Blood Glucose       Troponin     Lipids  No results found for: CHOL, CHLPL, TRIG,  HDL, LDL, LDLDIRECT    UA    Results from last 7 days   Lab Units 12/03/20 1951   NITRITE UA  Negative   WBC UA /HPF Too Numerous to Count*   BACTERIA UA /HPF 3+*   SQUAM EPITHEL UA /HPF None Seen   URINECX  Culture in progress       Microbiology   Results from last 7 days   Lab Units 12/03/20 1951 12/03/20  1837   URINECX  Culture in progress Growth present, too young to evaluate       ABG        EKG  No orders to display       Imaging:  No radiology results for the last 7 days          Xrays, labs reviewed personally by physician.    Medication Review:   I have reviewed the patient's current medication list      Scheduled Meds  ampicillin, 1 g, Intravenous, Q6H  cefTRIAXone, 2 g, Intravenous, Q24H  QUEtiapine, 100 mg, Oral, Nightly  QUEtiapine, 50 mg, Oral, BID  sodium chloride, 3 mL, Intravenous, Q12H        Meds Infusions  lactated ringers, 125 mL/hr, Last Rate: 125 mL/hr (12/04/20 1155)        Meds PRN  •  acetaminophen **OR** acetaminophen **OR** acetaminophen  •  acetaminophen  •  aluminum-magnesium hydroxide-simethicone  •  bisacodyl  •  Calcium Gluconate-NaCl **AND** calcium gluconate **AND** Calcium, Ionized  •  loperamide  •  magnesium hydroxide  •  ondansetron **OR** ondansetron  •  polyethylene glycol  •  potassium chloride  •  QUEtiapine  •  [COMPLETED] Insert peripheral IV **AND** sodium chloride  •  sodium chloride  •  ziprasidone (GEODON) injection with sterile water      Assessment/Plan   Assessment/Plan     Active Hospital Problems    Diagnosis  POA   • Gross hematuria [R31.0]  Yes   • Acute UTI (urinary tract infection) [N39.0]  Yes      Resolved Hospital Problems   No resolved problems to display.       MEDICAL DECISION MAKING COMPLEXITY BY PROBLEM:     UTI--Catheter associated  -Present on admission  -New Treviño placed in ED; previous Treviño catheter discontinued  -UA suggestive of UTI with 3+ bacteria  -Continue empiric ceftriaxone for now    Gross hematuria  -Hgb down to 7.0  -Likely just due  to UTI, however UTI should not cause drop in hemoglobin  -Consult urology for further evaluation    Normocytic anemia  -HgbA1c 7.9 on admission, down to 7.5, now 7.0  -Type and screen and transfuse 1 unit pRBCs  -Continue to monitor CBC/H&H    Dementia with behavioral disturbance  -Patient is an inpatient at [facility]  -Continue Seroquel  -1 mg Ativan every 6 hours as needed for agitation  -Consult psychiatry for additional medication recommendations    Hypocalcemia  -Ca 7.9; ionized calcium 1.11  -Replacement as per protocol    Chronic constipation  -Continue PRN meds    VTE Prophylaxis  Mechanical Order History:      Ordered        12/03/20 2350  Place Sequential Compression Device  Once         12/03/20 2350  Maintain Sequential Compression Device  Continuous                 Pharmalogical Order History:     None          Code Status  Code Status and Medical Interventions:   Ordered at: 12/03/20 2127     Code Status:    CPR     Medical Interventions (Level of Support Prior to Arrest):    Full       This patient has been examined wearing appropriate Personal Protective Equipment. 12/04/20      Discharge Planning    Pending clinical course.      Electronically signed by Geetha De La Cruz MD, 12/04/20, 15:21 EST.    [facility] Hospitalist Team

## 2020-12-05 LAB
ANION GAP SERPL CALCULATED.3IONS-SCNC: 8 MMOL/L (ref 5–15)
BACTERIA SPEC AEROBE CULT: NO GROWTH
BASOPHILS # BLD AUTO: 0 10*3/MM3 (ref 0–0.2)
BASOPHILS NFR BLD AUTO: 0.3 % (ref 0–1.5)
BUN SERPL-MCNC: 10 MG/DL (ref 8–23)
BUN/CREAT SERPL: 16.7 (ref 7–25)
CALCIUM SPEC-SCNC: 7.5 MG/DL (ref 8.6–10.5)
CHLORIDE SERPL-SCNC: 107 MMOL/L (ref 98–107)
CO2 SERPL-SCNC: 26 MMOL/L (ref 22–29)
CREAT SERPL-MCNC: 0.6 MG/DL (ref 0.57–1)
DEPRECATED RDW RBC AUTO: 53.4 FL (ref 37–54)
EOSINOPHIL # BLD AUTO: 0 10*3/MM3 (ref 0–0.4)
EOSINOPHIL NFR BLD AUTO: 0 % (ref 0.3–6.2)
ERYTHROCYTE [DISTWIDTH] IN BLOOD BY AUTOMATED COUNT: 19.4 % (ref 12.3–15.4)
GFR SERPL CREATININE-BSD FRML MDRD: 98 ML/MIN/1.73
GLUCOSE SERPL-MCNC: 85 MG/DL (ref 65–99)
HCT VFR BLD AUTO: 25.5 % (ref 34–46.6)
HEMOCCULT STL QL IA: NEGATIVE
HGB BLD-MCNC: 8.2 G/DL (ref 12–15.9)
LYMPHOCYTES # BLD AUTO: 1.3 10*3/MM3 (ref 0.7–3.1)
LYMPHOCYTES NFR BLD AUTO: 33.9 % (ref 19.6–45.3)
MCH RBC QN AUTO: 25.4 PG (ref 26.6–33)
MCHC RBC AUTO-ENTMCNC: 32.3 G/DL (ref 31.5–35.7)
MCV RBC AUTO: 78.8 FL (ref 79–97)
MONOCYTES # BLD AUTO: 0.4 10*3/MM3 (ref 0.1–0.9)
MONOCYTES NFR BLD AUTO: 9.7 % (ref 5–12)
NEUTROPHILS NFR BLD AUTO: 2.2 10*3/MM3 (ref 1.7–7)
NEUTROPHILS NFR BLD AUTO: 56.1 % (ref 42.7–76)
NRBC BLD AUTO-RTO: 0.1 /100 WBC (ref 0–0.2)
PLATELET # BLD AUTO: 297 10*3/MM3 (ref 140–450)
PMV BLD AUTO: 6.9 FL (ref 6–12)
POTASSIUM SERPL-SCNC: 3.8 MMOL/L (ref 3.5–5.2)
RBC # BLD AUTO: 3.23 10*6/MM3 (ref 3.77–5.28)
SODIUM SERPL-SCNC: 141 MMOL/L (ref 136–145)
WBC # BLD AUTO: 4 10*3/MM3 (ref 3.4–10.8)

## 2020-12-05 PROCEDURE — 25010000002 CEFTRIAXONE PER 250 MG: Performed by: INTERNAL MEDICINE

## 2020-12-05 PROCEDURE — 99232 SBSQ HOSP IP/OBS MODERATE 35: CPT | Performed by: PHYSICIAN ASSISTANT

## 2020-12-05 PROCEDURE — 99233 SBSQ HOSP IP/OBS HIGH 50: CPT | Performed by: INTERNAL MEDICINE

## 2020-12-05 PROCEDURE — 25010000002 AMPICILLIN PER 500 MG: Performed by: INTERNAL MEDICINE

## 2020-12-05 PROCEDURE — 25010000002 KETOROLAC TROMETHAMINE PER 15 MG: Performed by: INTERNAL MEDICINE

## 2020-12-05 PROCEDURE — 82274 ASSAY TEST FOR BLOOD FECAL: CPT | Performed by: INTERNAL MEDICINE

## 2020-12-05 PROCEDURE — 80048 BASIC METABOLIC PNL TOTAL CA: CPT | Performed by: INTERNAL MEDICINE

## 2020-12-05 PROCEDURE — 92526 ORAL FUNCTION THERAPY: CPT

## 2020-12-05 PROCEDURE — 85025 COMPLETE CBC W/AUTO DIFF WBC: CPT | Performed by: INTERNAL MEDICINE

## 2020-12-05 RX ORDER — KETOROLAC TROMETHAMINE 15 MG/ML
15 INJECTION, SOLUTION INTRAMUSCULAR; INTRAVENOUS EVERY 6 HOURS PRN
Status: DISCONTINUED | OUTPATIENT
Start: 2020-12-05 | End: 2020-12-06 | Stop reason: HOSPADM

## 2020-12-05 RX ADMIN — SODIUM CHLORIDE, POTASSIUM CHLORIDE, SODIUM LACTATE AND CALCIUM CHLORIDE 125 ML/HR: 600; 310; 30; 20 INJECTION, SOLUTION INTRAVENOUS at 09:29

## 2020-12-05 RX ADMIN — AMPICILLIN 1 G: 1 INJECTION, POWDER, FOR SOLUTION INTRAMUSCULAR; INTRAVENOUS at 04:14

## 2020-12-05 RX ADMIN — OLANZAPINE 5 MG: 10 INJECTION, POWDER, FOR SOLUTION INTRAMUSCULAR at 17:05

## 2020-12-05 RX ADMIN — Medication 3 ML: at 22:28

## 2020-12-05 RX ADMIN — Medication 3 ML: at 09:30

## 2020-12-05 RX ADMIN — OLANZAPINE 5 MG: 10 INJECTION, POWDER, FOR SOLUTION INTRAMUSCULAR at 04:17

## 2020-12-05 RX ADMIN — AMPICILLIN 1 G: 1 INJECTION, POWDER, FOR SOLUTION INTRAMUSCULAR; INTRAVENOUS at 23:09

## 2020-12-05 RX ADMIN — CEFTRIAXONE SODIUM 2 G: 2 INJECTION, POWDER, FOR SOLUTION INTRAMUSCULAR; INTRAVENOUS at 22:27

## 2020-12-05 RX ADMIN — AMPICILLIN 1 G: 1 INJECTION, POWDER, FOR SOLUTION INTRAMUSCULAR; INTRAVENOUS at 10:23

## 2020-12-05 RX ADMIN — KETOROLAC TROMETHAMINE 15 MG: 15 INJECTION, SOLUTION INTRAMUSCULAR; INTRAVENOUS at 09:30

## 2020-12-05 NOTE — THERAPY TREATMENT NOTE
Acute Care - IRF Speech Language Pathology   Swallow Treatment Note/Discharge    Davey     Patient Name: Haylee Blackwell  : 1945  MRN: [id number]  Today's Date: 2020               Admit Date: 12/3/2020    Visit Dx:      ICD-10-CM ICD-9-CM   1. Gross hematuria  R31.0 599.71   2. Cystitis with hematuria  N30.91 595.9     Patient Active Problem List   Diagnosis   • Gross hematuria   • Acute UTI (urinary tract infection)           Patient participated in a clinical swallow re-evaluation this date. Pt agitated upon arrival w/ nursing aid present. Pt agreeable to PO trials following multiple repetitions of SLP's introduction. Pt accepted water trials from spoon x3. No overt s/s of aspiration across 3/3 trials. Following 3rd trial, pt became agitated and threw cup. No further consistencies or trials administered d/t increased patient agitation.     Recommendations: Continued clear liquid diet w/ medications delivered as tolerated. ST will continue to follow to complete swallow re-evaluation with further recommendations as indicated and appropriate.       PPE worn: gloves, goggles, mask. Hand hygiene was performed prior to and after patient encounter.      SLP GOALS     Row Name 20 1600    Oral Nutrition/Hydration Goal 2, SLP  Pt will participate in clinical swallow re-eval to determine pt's safest/least restrictive diet. See above note.     -KAYLAN    Time Frame (Oral Nutrition/Hydration Goal 2, SLP)  3 days  -KAYLAN      User Key  (r) = Recorded By, (t) = Taken By, (c) = Cosigned By    Initials Name Provider Type    Mariajose Calderon Speech and Language Pathologist          EDUCATION  The patient has been educated in the following areas:   Oral Care/Hydration Modified Diet Instruction.                  Mariajose Franklin  2020

## 2020-12-05 NOTE — PLAN OF CARE
Goal Outcome Evaluation:  Plan of Care Reviewed With: patient  Progress: no change  Outcome Summary: Patient is confusion and hard to understand speech. Patient has hollered throughout entire shift. Patient difficult to redirect. Patient remains with chronic lopez cath in place. Vital signs stable. Call light within reach. Will continue to monitor.

## 2020-12-06 VITALS
OXYGEN SATURATION: 92 % | RESPIRATION RATE: 12 BRPM | SYSTOLIC BLOOD PRESSURE: 149 MMHG | WEIGHT: 127.21 LBS | DIASTOLIC BLOOD PRESSURE: 56 MMHG | TEMPERATURE: 97.3 F | HEART RATE: 50 BPM | BODY MASS INDEX: 21.19 KG/M2 | HEIGHT: 65 IN

## 2020-12-06 PROBLEM — R31.0 GROSS HEMATURIA: Status: RESOLVED | Noted: 2020-12-03 | Resolved: 2020-12-06

## 2020-12-06 LAB
BACTERIA SPEC AEROBE CULT: ABNORMAL
BASOPHILS # BLD AUTO: 0 10*3/MM3 (ref 0–0.2)
BASOPHILS NFR BLD AUTO: 0.2 % (ref 0–1.5)
DEPRECATED RDW RBC AUTO: 55.1 FL (ref 37–54)
EOSINOPHIL # BLD AUTO: 0 10*3/MM3 (ref 0–0.4)
EOSINOPHIL NFR BLD AUTO: 0.3 % (ref 0.3–6.2)
ERYTHROCYTE [DISTWIDTH] IN BLOOD BY AUTOMATED COUNT: 19.4 % (ref 12.3–15.4)
HCT VFR BLD AUTO: 27 % (ref 34–46.6)
HGB BLD-MCNC: 8.3 G/DL (ref 12–15.9)
LYMPHOCYTES # BLD AUTO: 1.3 10*3/MM3 (ref 0.7–3.1)
LYMPHOCYTES NFR BLD AUTO: 39.1 % (ref 19.6–45.3)
MCH RBC QN AUTO: 24.7 PG (ref 26.6–33)
MCHC RBC AUTO-ENTMCNC: 30.9 G/DL (ref 31.5–35.7)
MCV RBC AUTO: 80 FL (ref 79–97)
MONOCYTES # BLD AUTO: 0.3 10*3/MM3 (ref 0.1–0.9)
MONOCYTES NFR BLD AUTO: 9 % (ref 5–12)
NEUTROPHILS NFR BLD AUTO: 1.6 10*3/MM3 (ref 1.7–7)
NEUTROPHILS NFR BLD AUTO: 51.4 % (ref 42.7–76)
NRBC BLD AUTO-RTO: 0.2 /100 WBC (ref 0–0.2)
PLATELET # BLD AUTO: 229 10*3/MM3 (ref 140–450)
PMV BLD AUTO: 6.8 FL (ref 6–12)
RBC # BLD AUTO: 3.38 10*6/MM3 (ref 3.77–5.28)
WBC # BLD AUTO: 3.2 10*3/MM3 (ref 3.4–10.8)

## 2020-12-06 PROCEDURE — 85025 COMPLETE CBC W/AUTO DIFF WBC: CPT | Performed by: INTERNAL MEDICINE

## 2020-12-06 PROCEDURE — 99239 HOSP IP/OBS DSCHRG MGMT >30: CPT | Performed by: INTERNAL MEDICINE

## 2020-12-06 PROCEDURE — 92526 ORAL FUNCTION THERAPY: CPT

## 2020-12-06 PROCEDURE — 25010000002 KETOROLAC TROMETHAMINE PER 15 MG: Performed by: INTERNAL MEDICINE

## 2020-12-06 PROCEDURE — 25010000002 ZIPRASIDONE MESYLATE PER 10 MG: Performed by: INTERNAL MEDICINE

## 2020-12-06 PROCEDURE — 25010000002 AMPICILLIN PER 500 MG: Performed by: INTERNAL MEDICINE

## 2020-12-06 RX ORDER — AMOXICILLIN 250 MG/5ML
250 POWDER, FOR SUSPENSION ORAL 3 TIMES DAILY
Qty: 150 ML | Refills: 0 | Status: SHIPPED | OUTPATIENT
Start: 2020-12-06 | End: 2020-12-16

## 2020-12-06 RX ADMIN — ZIPRASIDONE MESYLATE 10 MG: 20 INJECTION, POWDER, LYOPHILIZED, FOR SOLUTION INTRAMUSCULAR at 09:05

## 2020-12-06 RX ADMIN — OLANZAPINE 5 MG: 10 INJECTION, POWDER, FOR SOLUTION INTRAMUSCULAR at 07:54

## 2020-12-06 RX ADMIN — AMPICILLIN 1 G: 1 INJECTION, POWDER, FOR SOLUTION INTRAMUSCULAR; INTRAVENOUS at 04:49

## 2020-12-06 RX ADMIN — KETOROLAC TROMETHAMINE 15 MG: 15 INJECTION, SOLUTION INTRAMUSCULAR; INTRAVENOUS at 07:54

## 2020-12-06 RX ADMIN — AMPICILLIN 1 G: 1 INJECTION, POWDER, FOR SOLUTION INTRAMUSCULAR; INTRAVENOUS at 17:28

## 2020-12-06 RX ADMIN — AMPICILLIN 1 G: 1 INJECTION, POWDER, FOR SOLUTION INTRAMUSCULAR; INTRAVENOUS at 08:53

## 2020-12-06 NOTE — PROGRESS NOTES
Discharge Planning Assessment   Davey     Patient Name: Haylee Blackwell  MRN: [id number]  Today's Date: 12/6/2020    Admit Date: 12/3/2020      Discharge Plan     Row Name 12/06/20 1710       Plan    Plan  DC to [facility]    Plan Comments  [facility] MSW informed Sakshi that Ambulance pickup time is 7:00pm. RN and MD aware.        Continued Care and Services - Admitted Since 12/3/2020     Destination     Service Provider Request Status Selected Services Address Phone Fax Patient Preferred    [facility]  Accepted N/A [address] [place] [id number] [phone] -- --              Nellie GREENW, LSW  Weekend   Care Management Dept  Cell [phone]  Weekday Department [phone]

## 2020-12-06 NOTE — DISCHARGE PLACEMENT REQUEST
"Haylee Blackwell (75 y.o. Female)     Date of Birth Social Security Number Address Home Phone MRN    1945  [address] [place] [address] [phone] [id number]    Hindu Marital Status          None        Admission Date Admission Type Admitting Provider Attending Provider Department, Room/Bed    12/3/20 Emergency Geetha De La Cruz MD Kapadia, Shefali A, MD [facility] SURGICAL INPATIENT, 4104/1    Discharge Date Discharge Disposition Discharge Destination                       Attending Provider: Geetha De La Cruz MD    Allergies: No Known Allergies    Isolation: None   Infection: None   Code Status: CPR    Ht: 165.1 cm (65\")   Wt: 57.7 kg (127 lb 3.3 oz)    Admission Cmt: None   Principal Problem: None                Active Insurance as of 12/3/2020     Primary Coverage     Payor Plan Insurance Group Employer/Plan Group    MEDICARE MEDICARE A & B      Payor Plan Address Payor Plan Phone Number Payor Plan Fax Number Effective Dates    [address] [phone]  9/1/2012 - None Entered    [facility] [id number]       Subscriber Name Subscriber Birth Date Member ID       HAYLEE BLACKWELL 1945 [id number]           Secondary Coverage     Payor Plan Insurance Group Employer/Plan Group    [place]      Payor Plan Address Payor Plan Phone Number Payor Plan Fax Number Effective Dates    [address]   1/1/2020 - None Entered    [place] [address]       Subscriber Name Subscriber Birth Date Member ID       HAYLEE BLACKWELL 1945 [id number]                 Emergency Contacts      (Rel.) Home Phone Work Phone Mobile Phone    ARIEL FRAZIER (Guardian) [phone] -- [phone]    FRAZIERANA LUISA GODFREY (Son) [phone] -- [phone]               History & Physical      Essing-Tasha Read APRN at 12/03/20 2127     Attestation signed by Geetha De La Cruz MD at 12/04/20 1521    Attending Physician Attestation    I have reviewed the " mid-level provider documentation, agree with the documentation, medical decision making and treatment plan as outlined by the mid-level provider.                         [facility] Medicine Services      Patient Name: Haylee Blackwell  : 1945  MRN: [id number]  Primary Care Physician: Cecelia, No Known  Date of admission: 12/3/2020    Patient Care Team:  Provider, No Known as PCP - General          Subjective   History Present Illness     Chief Complaint:   Chief Complaint   Patient presents with   • Blood in Urine     blood in urine,                   75 year old female brought from [facility] for hematuria. She has recent PMH of dementia with behavioral disturbance. She has been at [facility] since 20. She is awake but answers inappropriately . Per review of records from [facility] she seems to be at baseline and has PMH of combativeness. She is cooperative with exam but no information can be obtained by patient .She is afebrile , serum labs show Hgb 7.9 with no comparison, glucose 141 and Ca 7.9.  UA does show 3+ bacteria and large blood. She was started on IV Ceftriaxone in ED with urine culture pending and given 1L IVF hydration. PMH per records of arthritis and constipation. She will be admitted for further evaluation and treatment . No observed symptoms suggestive of Covid-19. She arrived with a Treviño catheter which was changed in ED.      Review of Systems   Unable to perform ROS: psychiatric disorder           Personal History     Past Medical History:   Past Medical History:   Diagnosis Date   • Dementia (CMS/HCC)    • Osteoarthritis        Surgical History:    History reviewed. No pertinent surgical history.        Family History: Family history is unknown by patient. Otherwise pertinent FHx was reviewed and unremarkable.     Social History: Alcohol use questions deferred to the physician. Drug use questions deferred to the physician.      Medications:  Prior  to Admission medications    Not on File       Allergies:  No Known Allergies    Objective   Objective     Vital Signs  Temp:  [97.8 °F (36.6 °C)] 97.8 °F (36.6 °C)  Heart Rate:  [66-99] 99  Resp:  [18] 18  BP: (106-136)/(39-87) 136/87  SpO2:  [84 %-100 %] 84 %  on   ;   Device (Oxygen Therapy): room air  Body mass index is 21.17 kg/m².    Physical Exam  Vitals signs reviewed.   Constitutional:       Appearance: She is normal weight.   HENT:      Head: Normocephalic and atraumatic.      Left Ear: External ear normal.      Nose: Nose normal.      Mouth/Throat:      Mouth: Mucous membranes are moist.   Eyes:      Extraocular Movements: Extraocular movements intact.   Neck:      Musculoskeletal: Normal range of motion and neck supple.   Cardiovascular:      Rate and Rhythm: Normal rate and regular rhythm.      Pulses: Normal pulses.      Heart sounds: Normal heart sounds.   Pulmonary:      Effort: Pulmonary effort is normal.      Breath sounds: Normal breath sounds.   Abdominal:      Palpations: Abdomen is soft.   Genitourinary:     Comments: deferred  Musculoskeletal: Normal range of motion.   Skin:     General: Skin is warm and dry.   Neurological:      General: No focal deficit present.      Mental Status: She is alert. Mental status is at baseline.   Psychiatric:      Comments: Cooperative but confused          Results Review:  I have personally reviewed most recent lab results and agree with findings, most notably: UA.    Results from last 7 days   Lab Units 12/03/20  1852   WBC 10*3/mm3 3.90   HEMOGLOBIN g/dL 7.9*   HEMATOCRIT % 25.9*   PLATELETS 10*3/mm3 280     Results from last 7 days   Lab Units 12/03/20  1852   SODIUM mmol/L 140   POTASSIUM mmol/L 3.6   CHLORIDE mmol/L 107   CO2 mmol/L 26.0   BUN mg/dL 20   CREATININE mg/dL 0.97   GLUCOSE mg/dL 141*   CALCIUM mg/dL 7.9*   ALT (SGPT) U/L 7   AST (SGOT) U/L 14     Estimated Creatinine Clearance: 47.1 mL/min (by C-G formula based on SCr of 0.97 mg/dL).  Brief  Urine Lab Results  (Last result in the past 365 days)      Color   Clarity   Blood   Leuk Est   Nitrite   Protein   CREAT   Urine HCG        12/03/20 1951 Dark Yellow  Comment:  Result checked  Turbid  Comment:  Result checked  Large (3+) Large (3+) Negative 100 mg/dL (2+)               Microbiology Results (last 10 days)     ** No results found for the last 240 hours. **          ECG/EMG Results (most recent)     None                    No radiology results for the last 7 days      Estimated Creatinine Clearance: 47.1 mL/min (by C-G formula based on SCr of 0.97 mg/dL).    Assessment/Plan   Assessment/Plan       Active Hospital Problems    Diagnosis  POA   • Gross hematuria [R31.0]  Yes   • Acute UTI (urinary tract infection) [N39.0]  Yes      Resolved Hospital Problems   No resolved problems to display.     Gross hematuria likely secondary to UTI 3+ bacteria , IV Ceftriaxone continued with urine culture pending, IVF hydration Hgb 7.9 repeat cbc in am . New Treviño placed in ED, may need Urology consult or CT scan if Hgb drops.    Normocytic anemia Hgb 7.9 baseline unknown , see above    Hypocalcemia 7.9 ionized Ca pending, replacement protocol in place    Dementia with behavioral disturbance - is inpatient at [facility], on Seroquel , 1 mg Ativan q 6 hrs prn for agitation , may need inpateint psych while here if combative    Chronic constipation - continue home prn meds     Arthritis per records - on Tylenol             VTE Prophylaxis -   Mechanical Order History:      Ordered        Signed and Held  Place Sequential Compression Device  Once         Signed and Held  Maintain Sequential Compression Device  Continuous                 Pharmalogical Order History:     None          CODE STATUS:    Code Status and Medical Interventions:   Ordered at: 12/03/20 2127     Code Status:    CPR     Medical Interventions (Level of Support Prior to Arrest):    Full       This patient has been examined wearing appropriate Personal  "Protective Equipment  12/03/20      I discussed the patient's findings and my recommendations with patient and nursing staff.      Signature:Electronically signed by ANMOL Aparicio, 12/03/20, 11:42 PM EST.    [facility] Hospitalist Team          Electronically signed by Geetha De La Cruz MD at 12/04/20 1521          Consult Notes (last 48 hours) (Notes from 12/03/20 1931 through 12/05/20 1931)      Carol Feliz MD at 12/04/20 1444            Referring Provider: Dr De La Cruz  Reason for Consultation: agitation       Chief complaint \"help! Help! Help!\"     Subjective .     History of present illness:  The patient is a 75 y.o. female who was admitted from [facility]  secondary to hematuria. PMH: dementia, osteoarthritis.   Psych consult was requested by Dr De La Cruz 2ry to agitation .  The patient was unable to provide any information secondary significant cognitive decline and agitation, patient was in bed, yelling \"help:, Did not respond for redirections, unable to answer any questions.   Past psychiatric history: Dementia      Review of Systems   Review of systems could not be obtained due to   patient confusion.    History    Past Medical History:   Diagnosis Date   • Dementia (CMS/[facility])    • Osteoarthritis           Family History   Family history unknown: Yes   Family history: Unavailable at present time secondary to confusion     Social History     Tobacco Use   • Smoking status: Never Smoker   • Smokeless tobacco: Never Used   • Tobacco comment: Pt unable to verify   Substance Use Topics   • Alcohol use: Defer   • Drug use: Defer          Medications Prior to Admission   Medication Sig Dispense Refill Last Dose   • acetaminophen (TYLENOL) 325 MG tablet Take 650 mg by mouth Every 4 (Four) Hours As Needed for Mild Pain , Headache or Fever.      • aluminum-magnesium hydroxide-simethicone (MAALOX/MYLANTA) 200-200-20 MG/5ML suspension Take 30 mL by mouth Every 4 (Four) Hours As Needed for " "Indigestion or Heartburn.      • bisacodyl (DULCOLAX) 10 MG suppository Insert 10 mg into the rectum Daily As Needed for Constipation.      • loperamide (IMODIUM) 2 MG capsule Take 2 mg by mouth 4 (Four) Times a Day As Needed for Diarrhea.      • magnesium hydroxide (MILK OF MAGNESIA) 400 MG/5ML suspension Take 30 mL by mouth Daily As Needed for Constipation.      • polyethylene glycol (MIRALAX) 17 g packet Take 17 g by mouth Daily As Needed.      • QUEtiapine (SEROquel) 100 MG tablet Take 100 mg by mouth Every Night.      • QUEtiapine (SEROquel) 25 MG tablet Take 25 mg by mouth Every 4 (Four) Hours As Needed (aggitation).      • QUEtiapine (SEROquel) 50 MG tablet Take 50 mg by mouth 2 (Two) Times a Day. Morning and Afternoon           Scheduled Meds:  ampicillin, 1 g, Intravenous, Q6H  cefTRIAXone, 2 g, Intravenous, Q24H  QUEtiapine, 100 mg, Oral, Nightly  QUEtiapine, 50 mg, Oral, BID  sodium chloride, 3 mL, Intravenous, Q12H         Continuous Infusions:  lactated ringers, 125 mL/hr, Last Rate: 125 mL/hr (12/04/20 1155)        PRN Meds:  •  acetaminophen **OR** acetaminophen **OR** acetaminophen  •  acetaminophen  •  aluminum-magnesium hydroxide-simethicone  •  bisacodyl  •  Calcium Gluconate-NaCl **AND** calcium gluconate **AND** Calcium, Ionized  •  loperamide  •  magnesium hydroxide  •  ondansetron **OR** ondansetron  •  polyethylene glycol  •  potassium chloride  •  QUEtiapine  •  [COMPLETED] Insert peripheral IV **AND** sodium chloride  •  sodium chloride  •  ziprasidone (GEODON) injection with sterile water      Allergies:  Patient has no known allergies.      Objective     Vital Signs   /66 (BP Location: Right arm, Patient Position: Lying)   Pulse 82   Temp 97.8 °F (36.6 °C) (Oral)   Resp 18   Ht 165.1 cm (65\")   Wt 57.7 kg (127 lb 3.3 oz)   SpO2 96%   Breastfeeding No   BMI 21.17 kg/m²     Physical Exam:     General Appearance:    Confused, agitated    Head:    Normocephalic, without obvious " abnormality, atraumatic                   Mental Status Exam:    Hygiene:   fair  Cooperation:  uncooeprative and agitated   Eye Contact:  Poor  Psychomotor Behavior:  Aggitated  Affect:  labile   Speech:  Poor articulated, yelling   Thought Progress:  Unable to demonstrate  Thought Content:  Unable to demonstrate  Suicidal:  did not express   Homicidal:  None  Hallucinations:  Not demonstrated today  Delusion:  Unable to demonstrate  Memory:  Unable to evaluate  Orientation:  Unable to evaluate  Reliability:  poor  Insight:  None  Judgement:  Impaired  Impulse Control:  Impaired  Physical/Medical Issues:  Yes      Medications and allergies reviewed     Lab Results   Component Value Date    GLUCOSE 121 (H) 12/04/2020    CALCIUM 7.4 (L) 12/04/2020     12/04/2020    K 4.1 12/04/2020    CO2 26.0 12/04/2020     (H) 12/04/2020    BUN 16 12/04/2020    CREATININE 0.82 12/04/2020    EGFRIFNONA 68 12/04/2020    BCR 19.5 12/04/2020    ANIONGAP 7.0 12/04/2020       Last Urine Toxicity     There is no flowsheet data to display.          No results found for: PHENYTOIN, PHENOBARB, VALPROATE, CBMZ    Lab Results   Component Value Date     12/04/2020    BUN 16 12/04/2020    CREATININE 0.82 12/04/2020    WBC 3.60 12/04/2020       Brief Urine Lab Results  (Last result in the past 365 days)      Color   Clarity   Blood   Leuk Est   Nitrite   Protein   CREAT   Urine HCG        12/03/20 1951 Dark Yellow  Comment:  Result checked  Turbid  Comment:  Result checked  Large (3+) Large (3+) Negative 100 mg/dL (2+)               Assessment/Plan       Gross hematuria    Acute UTI (urinary tract infection)       Assessment: Delirium secondary to medical condition (UTI)  Dementia with behavioral disturbances  Treatment Plan: The patient is agitated, confused, continue Seroquel on current dose, the patient did not respond to the Geodon and lorazepam, discontinue lorazepam, start Zyprexa 5 mg IM twice a day as needed for  agitation  Continue to provide support  Treatment Plan discussed with: nursing     I discussed the patients findings and my recommendations with nursing staff    I have reviewed and approved the behavioral health treatment plans and problem list. Yes  Thank you for the consult   Referring MD has access to consult report and progress notes in EMR     Carol Feliz MD  20  14:45 EST            Electronically signed by Carol Feliz MD at 20 1454     Jorge Kruger MD at 20 1302      Consult Orders    1. Inpatient Urology Consult [[id number]] ordered by Geetha De La Cruz MD at 20 1222                    FIRST UROLOGY CONSULT      Patient Identification:  NAME:  Haylee Blackwell  Age:  75 y.o.   Sex:  female   :  1945   MRN:  [id number]       Chief complaint/Reason for consult: UTI, hematuria    History of present illness:  75 y.o. female with significant history of dementia and schizophrenia who is combative and agitated.  She was admitted to the hospital for UTI with hematuria.  Treviño was changed yesterday.  We are consulted for anemia with associated hematuria.  Urine however per nursing is look the same since admission pink in bag cloudy in tubing.  She is unable to provide history due to her current mental state.  Baseline hemoglobin is around 8 in August.  Now down to 7.0 from 7.5.      Past medical history:  Past Medical History:   Diagnosis Date   • Dementia (CMS/HCC)    • Osteoarthritis        Past surgical history:  History reviewed. No pertinent surgical history.    Allergies:  Patient has no known allergies.    Home medications:  Medications Prior to Admission   Medication Sig Dispense Refill Last Dose   • acetaminophen (TYLENOL) 325 MG tablet Take 650 mg by mouth Every 4 (Four) Hours As Needed for Mild Pain , Headache or Fever.      • aluminum-magnesium hydroxide-simethicone (MAALOX/MYLANTA) 200-200-20 MG/5ML suspension Take 30 mL by mouth Every 4 (Four) Hours As Needed  for Indigestion or Heartburn.      • bisacodyl (DULCOLAX) 10 MG suppository Insert 10 mg into the rectum Daily As Needed for Constipation.      • loperamide (IMODIUM) 2 MG capsule Take 2 mg by mouth 4 (Four) Times a Day As Needed for Diarrhea.      • magnesium hydroxide (MILK OF MAGNESIA) 400 MG/5ML suspension Take 30 mL by mouth Daily As Needed for Constipation.      • polyethylene glycol (MIRALAX) 17 g packet Take 17 g by mouth Daily As Needed.      • QUEtiapine (SEROquel) 100 MG tablet Take 100 mg by mouth Every Night.      • QUEtiapine (SEROquel) 25 MG tablet Take 25 mg by mouth Every 4 (Four) Hours As Needed (aggitation).      • QUEtiapine (SEROquel) 50 MG tablet Take 50 mg by mouth 2 (Two) Times a Day. Morning and Afternoon           Hospital medications:  cefTRIAXone, 2 g, Intravenous, Q24H  QUEtiapine, 100 mg, Oral, Nightly  QUEtiapine, 50 mg, Oral, BID  sodium chloride, 3 mL, Intravenous, Q12H      lactated ringers, 125 mL/hr, Last Rate: 125 mL/hr (20 1155)      •  acetaminophen **OR** acetaminophen **OR** acetaminophen  •  acetaminophen  •  aluminum-magnesium hydroxide-simethicone  •  bisacodyl  •  Calcium Gluconate-NaCl **AND** calcium gluconate **AND** Calcium, Ionized  •  loperamide  •  LORazepam  •  magnesium hydroxide  •  ondansetron **OR** ondansetron  •  polyethylene glycol  •  potassium chloride  •  QUEtiapine  •  [COMPLETED] Insert peripheral IV **AND** sodium chloride  •  sodium chloride  •  ziprasidone (GEODON) injection with sterile water    Family history:  Family History   Family history unknown: Yes       Social history:  Social History     Tobacco Use   • Smoking status: Never Smoker   • Smokeless tobacco: Never Used   • Tobacco comment: Pt unable to verify   Substance Use Topics   • Alcohol use: Defer   • Drug use: Defer       REVIEW OF SYSTEMS:  Patient unable to provide review of systems due to mental state    Objective:  TMax 24 hours:   Temp (24hrs), Av.3 °F (36.8 °C),  Min:97.7 °F (36.5 °C), Max:99.9 °F (37.7 °C)      Vitals Ranges:   Temp:  [97.7 °F (36.5 °C)-99.9 °F (37.7 °C)] 97.7 °F (36.5 °C)  Heart Rate:  [66-99] 76  Resp:  [16-18] 16  BP: (106-137)/(39-87) 128/62    Intake/Output Last 3 shifts:  I/O last 3 completed shifts:  In: -   Out: 350 [Urine:350]     Physical Exam:    General Appearance:   Agitated and combative   HEENT:    No trauma, pupils reactive, hearing intact   Back:    No deformity   Lungs:     Respirations unlabored, no wheezing    Heart:    No cyanosis, No significant edema   Abdomen:     Soft, ND   :   Treviño with cloudy urine in tubing, pink in bag, no clots or significant hematuria   Extremities:   No edema, no deformity   Lymphatic:   No neck or groin LAD   Skin:   No bleeding, bruising or rashes   Neuro/Psych:  Agitated and combative       Results review:   I reviewed the patient's new clinical results.    Data review:  Lab Results (last 24 hours)     Procedure Component Value Units Date/Time    Potassium [[id number]] Collected: 12/04/20 1253    Specimen: Blood Updated: 12/04/20 1257    Hemoglobin & Hematocrit, Blood [[id number]]  (Abnormal) Collected: 12/04/20 1104    Specimen: Blood Updated: 12/04/20 1143     Hemoglobin 7.0 g/dL      Hematocrit 22.4 %     Urine Culture - Urine, Urine, Clean Catch [[id number]]  (Normal) Collected: 12/03/20 1837    Specimen: Urine, Clean Catch Updated: 12/04/20 1022     Urine Culture Growth present, too young to evaluate    Urine Culture - Urine, Urine, Catheter [[id number]]  (Normal) Collected: 12/03/20 1951    Specimen: Urine, Catheter Updated: 12/04/20 1022     Urine Culture Culture in progress    Basic Metabolic Panel [[id number]]  (Abnormal) Collected: 12/04/20 0231    Specimen: Blood Updated: 12/04/20 0316     Glucose 121 mg/dL      BUN 16 mg/dL      Creatinine 0.82 mg/dL      Sodium 143 mmol/L      Potassium 3.3 mmol/L      Chloride 110 mmol/L      CO2 26.0 mmol/L      Calcium 7.4 mg/dL      eGFR Non  Amer 68  mL/min/1.73      BUN/Creatinine Ratio 19.5     Anion Gap 7.0 mmol/L     Narrative:      GFR Normal >60  Chronic Kidney Disease <60  Kidney Failure <15      Calcium, Ionized [[id number]]  (Abnormal) Collected: 12/04/20 0231    Specimen: Blood Updated: 12/04/20 0256     Ionized Calcium 1.11 mmol/L     CBC & Differential [[id number]]  (Abnormal) Collected: 12/04/20 0231    Specimen: Blood Updated: 12/04/20 0254    Narrative:      The following orders were created for panel order CBC & Differential.  Procedure                               Abnormality         Status                     ---------                               -----------         ------                     CBC Auto Differential[[id number]]        Abnormal            Final result                 Please view results for these tests on the individual orders.    CBC Auto Differential [[id number]]  (Abnormal) Collected: 12/04/20 0231    Specimen: Blood Updated: 12/04/20 0254     WBC 3.60 10*3/mm3      RBC 2.90 10*6/mm3      Hemoglobin 7.5 g/dL      Hematocrit 23.7 %      MCV 81.7 fL      MCH 25.7 pg      MCHC 31.5 g/dL      RDW 19.7 %      RDW-SD 57.3 fl      MPV 7.6 fL      Platelets 245 10*3/mm3      Neutrophil % 60.6 %      Lymphocyte % 31.1 %      Monocyte % 8.0 %      Eosinophil % 0.1 %      Basophil % 0.2 %      Neutrophils, Absolute 2.20 10*3/mm3      Lymphocytes, Absolute 1.10 10*3/mm3      Monocytes, Absolute 0.30 10*3/mm3      Eosinophils, Absolute 0.00 10*3/mm3      Basophils, Absolute 0.00 10*3/mm3      nRBC 0.0 /100 WBC     Urinalysis, Microscopic Only - Urine, Catheter [[id number]]  (Abnormal) Collected: 12/03/20 1951    Specimen: Urine, Catheter Updated: 12/03/20 2027     RBC, UA Too Numerous to Count /HPF      WBC, UA Too Numerous to Count /HPF      Bacteria, UA 3+ /HPF      Squamous Epithelial Cells, UA None Seen /HPF      Hyaline Casts, UA None Seen /LPF      Methodology Manual Light Microscopy    Urinalysis With Culture If Indicated - Urine,  Catheter [[id number]]  (Abnormal) Collected: 12/03/20 1951    Specimen: Urine, Catheter Updated: 12/03/20 2008     Color, UA Dark Yellow     Comment: Result checked         Appearance, UA Turbid     Comment: Result checked         pH, UA 6.0     Specific Gravity, UA 1.019     Glucose, UA Negative     Ketones, UA Trace     Bilirubin, UA Negative     Blood, UA Large (3+)     Protein,  mg/dL (2+)     Leuk Esterase, UA Large (3+)     Nitrite, UA Negative     Urobilinogen, UA 1.0 E.U./dL    Urinalysis, Microscopic Only - Urine, Clean Catch [[id number]]  (Abnormal) Collected: 12/03/20 1837    Specimen: Urine, Clean Catch Updated: 12/03/20 1941     RBC, UA Too Numerous to Count /HPF      WBC, UA Too Numerous to Count /HPF      Bacteria, UA 3+ /HPF      Squamous Epithelial Cells, UA None Seen /HPF      Hyaline Casts, UA None Seen /LPF      Methodology Manual Light Microscopy    Comprehensive Metabolic Panel [[id number]]  (Abnormal) Collected: 12/03/20 1852    Specimen: Blood Updated: 12/03/20 1930     Glucose 141 mg/dL      BUN 20 mg/dL      Creatinine 0.97 mg/dL      Sodium 140 mmol/L      Potassium 3.6 mmol/L      Comment: Slight hemolysis detected by analyzer. Results may be affected.        Chloride 107 mmol/L      CO2 26.0 mmol/L      Calcium 7.9 mg/dL      Total Protein 6.1 g/dL      Albumin 2.00 g/dL      ALT (SGPT) 7 U/L      AST (SGOT) 14 U/L      Alkaline Phosphatase 66 U/L      Total Bilirubin 0.2 mg/dL      eGFR Non African Amer 56 mL/min/1.73      Globulin 4.1 gm/dL      A/G Ratio 0.5 g/dL      BUN/Creatinine Ratio 20.6     Anion Gap 7.0 mmol/L     Narrative:      GFR Normal >60  Chronic Kidney Disease <60  Kidney Failure <15      Lipase [[id number]]  (Normal) Collected: 12/03/20 1852    Specimen: Blood Updated: 12/03/20 1919     Lipase 22 U/L     CBC & Differential [[id number]]  (Abnormal) Collected: 12/03/20 1852    Specimen: Blood Updated: 12/03/20 1858    Narrative:      The following orders were created  for panel order CBC & Differential.  Procedure                               Abnormality         Status                     ---------                               -----------         ------                     CBC Auto Differential[[id number]]        Abnormal            Final result                 Please view results for these tests on the individual orders.    CBC Auto Differential [[id number]]  (Abnormal) Collected: 12/03/20 1852    Specimen: Blood Updated: 12/03/20 1858     WBC 3.90 10*3/mm3      RBC 3.21 10*6/mm3      Hemoglobin 7.9 g/dL      Hematocrit 25.9 %      MCV 80.8 fL      MCH 24.6 pg      MCHC 30.5 g/dL      RDW 19.7 %      RDW-SD 55.6 fl      MPV 7.2 fL      Platelets 280 10*3/mm3      Neutrophil % 65.4 %      Lymphocyte % 27.4 %      Monocyte % 6.4 %      Eosinophil % 0.5 %      Basophil % 0.3 %      Neutrophils, Absolute 2.60 10*3/mm3      Lymphocytes, Absolute 1.10 10*3/mm3      Monocytes, Absolute 0.30 10*3/mm3      Eosinophils, Absolute 0.00 10*3/mm3      Basophils, Absolute 0.00 10*3/mm3      nRBC 0.1 /100 WBC     Urinalysis With Culture If Indicated - Urine, Clean Catch [[id number]]  (Abnormal) Collected: 12/03/20 1837    Specimen: Urine, Clean Catch Updated: 12/03/20 1858     Color, UA Orange     Comment: Result checked         Appearance, UA Turbid     Comment: Result checked        pH, UA 5.5     Specific Gravity, UA 1.018     Glucose, UA Negative     Ketones, UA Trace     Bilirubin, UA Negative     Blood, UA Large (3+)     Protein,  mg/dL (2+)     Leuk Esterase, UA Large (3+)     Nitrite, UA Negative     Urobilinogen, UA 1.0 E.U./dL           Imaging:  Imaging Results (Last 24 Hours)     ** No results found for the last 24 hours. **             Assessment:       Gross hematuria    Acute UTI (urinary tract infection)      Plan:     Treviño draining cloudy urine, no significant hematuria  Baseline hemoglobin is around 8 now down to 7, do not suspect hematuria as major contributing  component given urine is clear in tubing just cloudy from infection and bag is pink  Given presence of UTI would suggest treat UTI and if still has hematuria proceed with full work-up, given patient's agitated and combative state imaging and cystoscopy even as inpatient would be difficult    Jorge Kruger MD  [facility]   [facility], [address]  [place] [address]  [phone]  20  13:02 EST       Electronically signed by Jorge Kruger MD at 20 1308       Nutrition Notes (last 48 hours) (Notes from 20 through 20)    No notes exist for this encounter.       Physical Therapy Notes (last 48 hours) (Notes from 20 through 20)    No notes exist for this encounter.       Occupational Therapy Notes (last 48 hours) (Notes from 20 through 20)    No notes exist for this encounter.          Speech Language Pathology Notes (last 48 hours) (Notes from 20 through 20)      Mariajose Franklin at 20 1657          Acute Care - [facility] Speech Language Pathology   Swallow Treatment Note/Discharge    Davey     Patient Name: Haylee Blackwell  : 1945  MRN: [id number]  Today's Date: 2020               Admit Date: 12/3/2020    Visit Dx:      ICD-10-CM ICD-9-CM   1. Gross hematuria  R31.0 599.71   2. Cystitis with hematuria  N30.91 595.9     Patient Active Problem List   Diagnosis   • Gross hematuria   • Acute UTI (urinary tract infection)           Patient participated in a clinical swallow re-evaluation this date. Pt agitated upon arrival w/ nursing aid present. Pt agreeable to PO trials following multiple repetitions of SLP's introduction. Pt accepted water trials from spoon x3. No overt s/s of aspiration across 3/3 trials. Following 3rd trial, pt became agitated and threw cup. No further consistencies or trials administered d/t increased patient agitation.     Recommendations: Continued clear liquid diet w/  medications delivered as tolerated. ST will continue to follow to complete swallow re-evaluation with further recommendations as indicated and appropriate.       PPE worn: gloves, goggles, mask. Hand hygiene was performed prior to and after patient encounter.      SLP GOALS     Row Name 20 1600    Oral Nutrition/Hydration Goal 2, SLP  Pt will participate in clinical swallow re-eval to determine pt's safest/least restrictive diet. See above note.     -KL    Time Frame (Oral Nutrition/Hydration Goal 2, SLP)  3 days  -KL      User Key  (r) = Recorded By, (t) = Taken By, (c) = Cosigned By    Initials Name Provider Type    Mariajose Calderon Speech and Language Pathologist          EDUCATION  The patient has been educated in the following areas:   Oral Care/Hydration Modified Diet Instruction.                  Mariajose Franklin  2020    Electronically signed by Mariajose Franklin at 20 2470     Dulce Graff SLP at 20 1132          Acute Care - Speech Language Pathology   Swallow Initial Evaluation  Davey     Patient Name: Haylee Blackwell  : 1945  MRN: [id number]  Today's Date: 2020               Admit Date: 12/3/2020    Visit Dx:     ICD-10-CM ICD-9-CM   1. Gross hematuria  R31.0 599.71   2. Cystitis with hematuria  N30.91 595.9     Patient Active Problem List   Diagnosis   • Gross hematuria   • Acute UTI (urinary tract infection)     Past Medical History:   Diagnosis Date   • Dementia (CMS/HCC)    • Osteoarthritis      History reviewed. No pertinent surgical history.     SWALLOW EVALUATION (last 72 hours)      SLP Adult Swallow Evaluation     Row Name 20 1600          Document Type  evaluation  -    Patient/Family/Caregiver Comments/Observations  Pt sleeping in bed but easily aroused. Pt easily agitated and had difficulty responding to redirection. She constantly attempted to remove tubes and lines and asked the SLP to take off her O2 sensor and F/C. Pt constantly yelling out  which was also reported in other notes.   -MF    Patient Effort  fair  -MF          Patient Profile Reviewed  yes  -MF    Pertinent History Of Current Problem  Pt is a 75 year old female brought from [facility] for hematuria. She has recent PMH of dementia with behavioral disturbance. She has been at [facility] since 11/17/20. She is awake but answers inappropriately . Per review of records from [facility] she seems to be at baseline and has PMH of combativeness.  -    Current Method of Nutrition  NPO  -MF    Prior Level of Function-Swallowing  unknown  -    Plans/Goals Discussed with  patient  -MF    Barriers to Rehab  cognitive status  -          Additional Documentation  Pain Scale: FACES Pre/Post-Treatment (Group)  -          Pain: FACES Scale, Pretreatment  0-->no hurt  -    Posttreatment Pain Rating  0-->no hurt  -MF          Dentition Assessment  missing teeth  -    Secretion Management  WNL/WFL  -MF    Mucosal Quality  moist, healthy  -          Oral Motor General Assessment  unable to assess  -          Respiratory Support Currently in Use  nasal cannula  -    Eating/Swallowing Skills  fed by SLP  -    Positioning During Eating  upright 90 degree;upright in bed  -    Utensils Used  cup;straw  -    Consistencies Trialed  thin liquids  -          Respiratory Status  WFL  -MF          Oral Prep Phase  impaired  -    Oral Transit  WFL  -MF    Oral Residue  WFL  -MF    Pharyngeal Phase  no overt signs/symptoms of pharyngeal impairment  -    Clinical Swallow Evaluation Summary  Pt participated in limited clinical swallow eval d/t failing swallow screen. RN reported pt agitated and combative when awake but cleared pt for eval since she does not have nutrition at this time. Pt easily aroused and also easily agitated, attempting to pull at lines/tubes with difficulty redirecting. Pt accepted x1 trial water via cup and x5 trials water via straw. Anterior spillage with  water via cup. Pt able to successfully drink from straw. No overt s/s of aspiration with any trial. Unable to assess solids due to refusal; therefore unable to make recommendations regarding solids. REC: clear liquid diet, meds as able. RN to discuss switching meds to IV with MD. Will f/u for re-eval and solid advancement as appropriate.  -          Oral Prep Concerns  anterior loss  -    Oral Prep Concerns, Comment  thin by cup  -          SLP Swallowing Diagnosis  functional pharyngeal phase  -    Functional Impact  risk of aspiration/pneumonia;risk of malnutrition;risk of dehydration  -    Rehab Potential/Prognosis, Swallowing  adequate, monitor progress closely  -    Swallow Criteria for Skilled Therapeutic Interventions Met  demonstrates skilled criteria  -          Therapy Frequency (Swallow)  PRN  -    Predicted Duration Therapy Intervention (Days)  until discharge  -    SLP Diet Recommendation  clear liquid diet  -    Recommended Diagnostics  reassess via clinical swallow evaluation  -    Recommended Precautions and Strategies  upright posture during/after eating;small bites of food and sips of liquid  -    Oral Care Recommendations  Oral Care BID/PRN  -    SLP Rec. for Method of Medication Administration  as tolerated  -    Monitor for Signs of Aspiration  yes;notify SLP if any concerns  -    Anticipated Discharge Disposition (SLP)  skilled nursing facility  -          Oral Nutrition/Hydration Goal Selection (SLP)  oral nutrition/hydration, SLP goal 1;oral nutrition/hydration, SLP goal 2  -          Oral Nutrition/Hydration Goal 1, SLP  Pt will tolerate her safest and least restrictive diet without complications from aspiration.   -    Time Frame (Oral Nutrition/Hydration Goal 1, SLP)  by discharge  -          Oral Nutrition/Hydration Goal 2, SLP  Pt will participate in clinical swallow re-eval to determine pt's safest/least restrictive diet.   -    Time Frame (Oral  Nutrition/Hydration Goal 2, SLP)  3 days  -      User Key  (r) = Recorded By, (t) = Taken By, (c) = Cosigned By    Initials Name Effective Dates    CITLALLI Graff MIKALA Cardona 06/17/19 -           EDUCATION  The patient has been educated in the following areas:   Modified Diet Instruction.    SLP Recommendation and Plan  SLP Swallowing Diagnosis: functional pharyngeal phase  SLP Diet Recommendation: clear liquid diet  Recommended Precautions and Strategies: upright posture during/after eating, small bites of food and sips of liquid  SLP Rec. for Method of Medication Administration: as tolerated     Monitor for Signs of Aspiration: yes, notify SLP if any concerns  Recommended Diagnostics: reassess via clinical swallow evaluation  Swallow Criteria for Skilled Therapeutic Interventions Met: demonstrates skilled criteria  Anticipated Discharge Disposition (SLP): skilled nursing facility  Rehab Potential/Prognosis, Swallowing: adequate, monitor progress closely  Therapy Frequency (Swallow): PRN  Predicted Duration Therapy Intervention (Days): until discharge                         Plan of Care Reviewed With: patient  Outcome Summary: Pt participated in limited clinical swallow eval d/t failing swallow screen. RN reported pt agitated and combative when awake but cleared pt for eval since she does not have nutrition at this time. Pt easily aroused and also easily agitated, attempting to pull at lines/tubes with difficulty redirecting. Pt accepted x1 trial water via cup and x5 trials water via straw. Anterior spillage with water via cup. Pt able to successfully drink from straw. No overt s/s of aspiration with any trial. Unable to assess solids due to refusal; therefore unable to make recommendations regarding solids. REC: clear liquid diet, meds as able. RN to discuss switching meds to IV with MD. Will f/u for re-eval and solid advancement as appropriate.    SLP GOALS     Row Name 12/04/20 1600             Oral  Nutrition/Hydration Goal 1 (SLP)    Oral Nutrition/Hydration Goal 1, SLP  Pt will tolerate her safest and least restrictive diet without complications from aspiration.   -      Time Frame (Oral Nutrition/Hydration Goal 1, SLP)  by discharge  -         Oral Nutrition/Hydration Goal 2 (SLP)    Oral Nutrition/Hydration Goal 2, SLP  Pt will participate in clinical swallow re-eval to determine pt's safest/least restrictive diet.   -      Time Frame (Oral Nutrition/Hydration Goal 2, SLP)  3 days  -        User Key  (r) = Recorded By, (t) = Taken By, (c) = Cosigned By    Initials Name Provider Type     Dulce Graff SLP Speech and Language Pathologist         Patient was not wearing a face mask during this therapy encounter. Therapist used appropriate personal protective equipment including mask, eye protection and gloves.  Mask used was standard procedure mask. Appropriate PPE was worn during the entire therapy session. Hand hygiene was completed before and after therapy session. Patient is not in enhanced droplet precautions.            Time Calculation:       Therapy Charges for Today     Code Description Service Date Service Provider Modifiers Qty    [id number]  ST EVAL ORAL PHARYNG SWALLOW 4 12/4/2020 Dulce Graff SLP GN 1               MIKALA Sanders  12/4/2020    Electronically signed by Dulce Graff SLP at 12/04/20 1618     Dulce Graff SLP at 12/04/20 1132        Goal Outcome Evaluation:  Plan of Care Reviewed With: patient  Progress: no change  Outcome Summary: Pt participated in limited clinical swallow eval d/t failing swallow screen. RN reported pt agitated and combative when awake but cleared pt for eval since she does not have nutrition at this time. Pt easily aroused and also easily agitated, attempting to pull at lines/tubes with difficulty redirecting. Pt accepted x1 trial water via cup and x5 trials water via straw. Anterior spillage with water via cup. Pt able to  successfully drink from straw. No overt s/s of aspiration with any trial. Unable to assess solids due to refusal; therefore unable to make recommendations regarding solids. REC: clear liquid diet, meds as able. RN to discuss switching meds to IV with MD. Will f/u for re-eval and solid advancement as appropriate.    Electronically signed by Dulce Graff, SLP at 12/04/20 1132       Respiratory Therapy Notes (last 48 hours) (Notes from 12/03/20 1931 through 12/05/20 1931)    No notes exist for this encounter.

## 2020-12-06 NOTE — DISCHARGE PLACEMENT REQUEST
"Haylee Blackwell (75 y.o. Female)     Date of Birth Social Security Number Address Home Phone MRN    1945  [address] [place] [address] [phone] [id number]    Amish Marital Status          None        Admission Date Admission Type Admitting Provider Attending Provider Department, Room/Bed    12/3/20 Emergency Geetha De La Cruz MD Kapadia, Shefali A, MD [facility], 4104/1    Discharge Date Discharge Disposition Discharge Destination         [facility] ([facility])              Attending Provider: Geetha De La Cruz MD    Allergies: No Known Allergies    Isolation: None   Infection: None   Code Status: CPR    Ht: 165.1 cm (65\")   Wt: 57.7 kg (127 lb 3.3 oz)    Admission Cmt: None   Principal Problem: None                Active Insurance as of 12/3/2020     Primary Coverage     Payor Plan Insurance Group Employer/Plan Group    MEDICARE MEDICARE A & B      Payor Plan Address Payor Plan Phone Number Payor Plan Fax Number Effective Dates    [address] [phone]  9/1/2012 - None Entered    [facility] [id number]       Subscriber Name Subscriber Birth Date Member ID       HAYLEE BLACKWELL 1945 [id number]           Secondary Coverage     Payor Plan Insurance Group Employer/Plan Group    [place]      Payor Plan Address Payor Plan Phone Number Payor Plan Fax Number Effective Dates    [address]   1/1/2020 - None Entered    [place] [address]       Subscriber Name Subscriber Birth Date Member ID       HAYLEE BLACKWELL 1945 [id number]                 Emergency Contacts      (Rel.) Home Phone Work Phone Mobile Phone    ARIEL FRAZIER (Guardian) [phone] -- [phone]    FREDDIEANA LUISA (Son) [phone] -- [phone]            Lab Results (last 72 hours)     Procedure Component Value Units Date/Time    CBC & Differential [[id number]]  (Abnormal) Collected: 12/06/20 1542    " Specimen: Blood Updated: 12/06/20 1600    Narrative:      The following orders were created for panel order CBC & Differential.  Procedure                               Abnormality         Status                     ---------                               -----------         ------                     CBC Auto Differential[[id number]]        Abnormal            Final result                 Please view results for these tests on the individual orders.    CBC Auto Differential [[id number]]  (Abnormal) Collected: 12/06/20 1542    Specimen: Blood Updated: 12/06/20 1600     WBC 3.20 10*3/mm3      RBC 3.38 10*6/mm3      Hemoglobin 8.3 g/dL      Hematocrit 27.0 %      MCV 80.0 fL      MCH 24.7 pg      MCHC 30.9 g/dL      RDW 19.4 %      RDW-SD 55.1 fl      MPV 6.8 fL      Platelets 229 10*3/mm3      Neutrophil % 51.4 %      Lymphocyte % 39.1 %      Monocyte % 9.0 %      Eosinophil % 0.3 %      Basophil % 0.2 %      Neutrophils, Absolute 1.60 10*3/mm3      Lymphocytes, Absolute 1.30 10*3/mm3      Monocytes, Absolute 0.30 10*3/mm3      Eosinophils, Absolute 0.00 10*3/mm3      Basophils, Absolute 0.00 10*3/mm3      nRBC 0.2 /100 WBC     Urine Culture - Urine, Urine, Clean Catch [[id number]]  (Abnormal)  (Susceptibility) Collected: 12/03/20 1837    Specimen: Urine, Clean Catch Updated: 12/06/20 0344     Urine Culture >100,000 CFU/mL Enterococcus faecalis    Susceptibility      Enterococcus faecalis     ANA     Ampicillin Susceptible     Levofloxacin Susceptible     Nitrofurantoin Susceptible     Tetracycline Resistant     Vancomycin Susceptible                    SCANNED - LABS [[id number]] Resulted: 12/03/20      Updated: 12/05/20 1147    Occult Blood, Fecal By Immunoassay - Stool, Per Rectum [[id number]]  (Normal) Collected: 12/05/20 1022    Specimen: Stool from Per Rectum Updated: 12/05/20 1056     Occult Blood, Fecal by Immunoassay Negative    Basic Metabolic Panel [[id number]]  (Abnormal) Collected: 12/05/20 0924    Specimen:  Blood Updated: 12/05/20 0958     Glucose 85 mg/dL      BUN 10 mg/dL      Creatinine 0.60 mg/dL      Sodium 141 mmol/L      Potassium 3.8 mmol/L      Chloride 107 mmol/L      CO2 26.0 mmol/L      Calcium 7.5 mg/dL      eGFR Non African Amer 98 mL/min/1.73      BUN/Creatinine Ratio 16.7     Anion Gap 8.0 mmol/L     Narrative:      GFR Normal >60  Chronic Kidney Disease <60  Kidney Failure <15      CBC & Differential [[id number]]  (Abnormal) Collected: 12/05/20 0924    Specimen: Blood Updated: 12/05/20 0949    Narrative:      The following orders were created for panel order CBC & Differential.  Procedure                               Abnormality         Status                     ---------                               -----------         ------                     CBC Auto Differential[[id number]]        Abnormal            Final result                 Please view results for these tests on the individual orders.    CBC Auto Differential [[id number]]  (Abnormal) Collected: 12/05/20 0924    Specimen: Blood Updated: 12/05/20 0949     WBC 4.00 10*3/mm3      RBC 3.23 10*6/mm3      Hemoglobin 8.2 g/dL      Hematocrit 25.5 %      MCV 78.8 fL      MCH 25.4 pg      MCHC 32.3 g/dL      RDW 19.4 %      RDW-SD 53.4 fl      MPV 6.9 fL      Platelets 297 10*3/mm3      Neutrophil % 56.1 %      Lymphocyte % 33.9 %      Monocyte % 9.7 %      Eosinophil % 0.0 %      Basophil % 0.3 %      Neutrophils, Absolute 2.20 10*3/mm3      Lymphocytes, Absolute 1.30 10*3/mm3      Monocytes, Absolute 0.40 10*3/mm3      Eosinophils, Absolute 0.00 10*3/mm3      Basophils, Absolute 0.00 10*3/mm3      nRBC 0.1 /100 WBC     Urine Culture - Urine, Urine, Catheter [[id number]]  (Normal) Collected: 12/03/20 1951    Specimen: Urine, Catheter Updated: 12/05/20 0335     Urine Culture No growth    Potassium [[id number]]  (Normal) Collected: 12/04/20 2030    Specimen: Blood Updated: 12/04/20 2044     Potassium 4.0 mmol/L     Potassium [[id number]]  (Normal)  Collected: 12/04/20 1253    Specimen: Blood Updated: 12/04/20 1320     Potassium 4.1 mmol/L     Hemoglobin & Hematocrit, Blood [[id number]]  (Abnormal) Collected: 12/04/20 1104    Specimen: Blood Updated: 12/04/20 1143     Hemoglobin 7.0 g/dL      Hematocrit 22.4 %     Basic Metabolic Panel [[id number]]  (Abnormal) Collected: 12/04/20 0231    Specimen: Blood Updated: 12/04/20 0316     Glucose 121 mg/dL      BUN 16 mg/dL      Creatinine 0.82 mg/dL      Sodium 143 mmol/L      Potassium 3.3 mmol/L      Chloride 110 mmol/L      CO2 26.0 mmol/L      Calcium 7.4 mg/dL      eGFR Non African Amer 68 mL/min/1.73      BUN/Creatinine Ratio 19.5     Anion Gap 7.0 mmol/L     Narrative:      GFR Normal >60  Chronic Kidney Disease <60  Kidney Failure <15      Calcium, Ionized [[id number]]  (Abnormal) Collected: 12/04/20 0231    Specimen: Blood Updated: 12/04/20 0256     Ionized Calcium 1.11 mmol/L     CBC & Differential [[id number]]  (Abnormal) Collected: 12/04/20 0231    Specimen: Blood Updated: 12/04/20 0254    Narrative:      The following orders were created for panel order CBC & Differential.  Procedure                               Abnormality         Status                     ---------                               -----------         ------                     CBC Auto Differential[[id number]]        Abnormal            Final result                 Please view results for these tests on the individual orders.    CBC Auto Differential [[id number]]  (Abnormal) Collected: 12/04/20 0231    Specimen: Blood Updated: 12/04/20 0254     WBC 3.60 10*3/mm3      RBC 2.90 10*6/mm3      Hemoglobin 7.5 g/dL      Hematocrit 23.7 %      MCV 81.7 fL      MCH 25.7 pg      MCHC 31.5 g/dL      RDW 19.7 %      RDW-SD 57.3 fl      MPV 7.6 fL      Platelets 245 10*3/mm3      Neutrophil % 60.6 %      Lymphocyte % 31.1 %      Monocyte % 8.0 %      Eosinophil % 0.1 %      Basophil % 0.2 %      Neutrophils, Absolute 2.20 10*3/mm3      Lymphocytes,  Absolute 1.10 10*3/mm3      Monocytes, Absolute 0.30 10*3/mm3      Eosinophils, Absolute 0.00 10*3/mm3      Basophils, Absolute 0.00 10*3/mm3      nRBC 0.0 /100 WBC     Urinalysis, Microscopic Only - Urine, Catheter [[id number]]  (Abnormal) Collected: 12/03/20 1951    Specimen: Urine, Catheter Updated: 12/03/20 2027     RBC, UA Too Numerous to Count /HPF      WBC, UA Too Numerous to Count /HPF      Bacteria, UA 3+ /HPF      Squamous Epithelial Cells, UA None Seen /HPF      Hyaline Casts, UA None Seen /LPF      Methodology Manual Light Microscopy    Urinalysis With Culture If Indicated - Urine, Catheter [[id number]]  (Abnormal) Collected: 12/03/20 1951    Specimen: Urine, Catheter Updated: 12/03/20 2008     Color, UA Dark Yellow     Comment: Result checked         Appearance, UA Turbid     Comment: Result checked         pH, UA 6.0     Specific Gravity, UA 1.019     Glucose, UA Negative     Ketones, UA Trace     Bilirubin, UA Negative     Blood, UA Large (3+)     Protein,  mg/dL (2+)     Leuk Esterase, UA Large (3+)     Nitrite, UA Negative     Urobilinogen, UA 1.0 E.U./dL    Urinalysis, Microscopic Only - Urine, Clean Catch [[id number]]  (Abnormal) Collected: 12/03/20 1837    Specimen: Urine, Clean Catch Updated: 12/03/20 1941     RBC, UA Too Numerous to Count /HPF      WBC, UA Too Numerous to Count /HPF      Bacteria, UA 3+ /HPF      Squamous Epithelial Cells, UA None Seen /HPF      Hyaline Casts, UA None Seen /LPF      Methodology Manual Light Microscopy    Comprehensive Metabolic Panel [[id number]]  (Abnormal) Collected: 12/03/20 1852    Specimen: Blood Updated: 12/03/20 1930     Glucose 141 mg/dL      BUN 20 mg/dL      Creatinine 0.97 mg/dL      Sodium 140 mmol/L      Potassium 3.6 mmol/L      Comment: Slight hemolysis detected by analyzer. Results may be affected.        Chloride 107 mmol/L      CO2 26.0 mmol/L      Calcium 7.9 mg/dL      Total Protein 6.1 g/dL      Albumin 2.00 g/dL      ALT (SGPT) 7 U/L       AST (SGOT) 14 U/L      Alkaline Phosphatase 66 U/L      Total Bilirubin 0.2 mg/dL      eGFR Non African Amer 56 mL/min/1.73      Globulin 4.1 gm/dL      A/G Ratio 0.5 g/dL      BUN/Creatinine Ratio 20.6     Anion Gap 7.0 mmol/L     Narrative:      GFR Normal >60  Chronic Kidney Disease <60  Kidney Failure <15      Lipase [[id number]]  (Normal) Collected: 12/03/20 1852    Specimen: Blood Updated: 12/03/20 1919     Lipase 22 U/L     CBC & Differential [[id number]]  (Abnormal) Collected: 12/03/20 1852    Specimen: Blood Updated: 12/03/20 1858    Narrative:      The following orders were created for panel order CBC & Differential.  Procedure                               Abnormality         Status                     ---------                               -----------         ------                     CBC Auto Differential[[id number]]        Abnormal            Final result                 Please view results for these tests on the individual orders.    CBC Auto Differential [[id number]]  (Abnormal) Collected: 12/03/20 1852    Specimen: Blood Updated: 12/03/20 1858     WBC 3.90 10*3/mm3      RBC 3.21 10*6/mm3      Hemoglobin 7.9 g/dL      Hematocrit 25.9 %      MCV 80.8 fL      MCH 24.6 pg      MCHC 30.5 g/dL      RDW 19.7 %      RDW-SD 55.6 fl      MPV 7.2 fL      Platelets 280 10*3/mm3      Neutrophil % 65.4 %      Lymphocyte % 27.4 %      Monocyte % 6.4 %      Eosinophil % 0.5 %      Basophil % 0.3 %      Neutrophils, Absolute 2.60 10*3/mm3      Lymphocytes, Absolute 1.10 10*3/mm3      Monocytes, Absolute 0.30 10*3/mm3      Eosinophils, Absolute 0.00 10*3/mm3      Basophils, Absolute 0.00 10*3/mm3      nRBC 0.1 /100 WBC     Urinalysis With Culture If Indicated - Urine, Clean Catch [[id number]]  (Abnormal) Collected: 12/03/20 1837    Specimen: Urine, Clean Catch Updated: 12/03/20 1858     Color, UA Orange     Comment: Result checked         Appearance, UA Turbid     Comment: Result checked        pH, UA 5.5     Specific  Gravity, UA 1.018     Glucose, UA Negative     Ketones, UA Trace     Bilirubin, UA Negative     Blood, UA Large (3+)     Protein,  mg/dL (2+)     Leuk Esterase, UA Large (3+)     Nitrite, UA Negative     Urobilinogen, UA 1.0 E.U./dL          Physician Progress Notes (last 24 hours) (Notes from 20 1706 through 20 1706)    No notes of this type exist for this encounter.              Discharge Summary      Geetha De La Cruz MD at 20 1603                [facility] Medicine Services  DISCHARGE SUMMARY        Prepared For PCP:  Provider, No Known    Patient Name: Haylee Blackwell  : 1945  MRN: [id number]      Date of Admission:   12/3/2020    Date of Discharge:  2020    Length of stay:  LOS: 2 days     Hospital Course     Presenting Problem:   Cystitis with hematuria [N30.91]  Gross hematuria [R31.0]  Gross hematuria [R31.0]      Active Hospital Problems    Diagnosis  POA   • Acute UTI (urinary tract infection) [N39.0]  Yes      Resolved Hospital Problems    Diagnosis Date Resolved POA   • Gross hematuria [R31.0] 2020 Yes           Hospital Course:  Haylee Blackwell is a 75 y.o. female brought from [facility] for hematuria. She has recent PMH of dementia with behavioral disturbance. She has been at [facility] since 20. She is awake but answers inappropriately . Per review of records from [facility] she seems to be at baseline and has PMH of combativeness. She is cooperative with exam but no information can be obtained by patient .She is afebrile , serum labs show Hgb 7.9 with no comparison, glucose 141 and Ca 7.9.  UA does show 3+ bacteria and large blood. She was started on IV Ceftriaxone in ED with urine culture pending and given 1L IVF hydration. PMH per records of arthritis and constipation. She will be admitted for further evaluation and treatment . No observed symptoms suggestive of Covid-19. She arrived with a Treviño catheter which was  changed in ED.    Patient was noted to have gross hematuria and was evaluated by urology who felt the hematuria was solely due to UTI. Urine culture grew out an Enterococcus faecalis that was sensitive to all but tetracyclines. She was treated with IV ampicillin during the admission.  She had some altered mentation, but that cleared up and she became more alert once the proper antibiotic was started.  She is being switch to oral ampicillin, and can return to [facility] upon discharge.  She did have a low Hgb of 7.0 and one unit of blood was transfused, and she stabilized with a Hgb of 8.2.  Although she came to our facility with a Treviño catheter in place, [facility] asked that it be removed prior to discharge. The catheter was removed and the patient passed a voiding trial.  She is now discharged in good condition back to her facility.    Day of Discharge     HPI: No acute events. Pt has stated no new complaints, but she is still difficult to understand.    Vital Signs:   Temp:  [97.3 °F (36.3 °C)-98.4 °F (36.9 °C)] 97.3 °F (36.3 °C)  Heart Rate:  [50-65] 50  Resp:  [12-20] 12  BP: (133-163)/(56-63) 149/56     Physical Exam:  General: well-developed and well-nourished, NAD  HEENT: NC/AT, EOMI, PERRLA  Heart: RRR. No murmur   Chest: CTAB, no w/r/r, normal respiratory effort  Abdominal: Soft. NT/ND. Bowel sounds present  Musculoskeletal: Normal ROM.  No edema. No calf tenderness.  Neurological: awake, alert, disoriented, incoherent  Skin: Skin is warm and dry. No rash  Psychiatric: confused, agitated    Pertinent  and/or Most Recent Results     Results from last 7 days   Lab Units 12/06/20  1542 12/05/20  0924 12/04/20  2030 12/04/20  1253 12/04/20  1104 12/04/20  0231 12/03/20  1852   WBC 10*3/mm3 3.20* 4.00  --   --   --  3.60 3.90   HEMOGLOBIN g/dL 8.3* 8.2*  --   --  7.0* 7.5* 7.9*   HEMATOCRIT % 27.0* 25.5*  --   --  22.4* 23.7* 25.9*   PLATELETS 10*3/mm3 229 297  --   --   --  245 280   SODIUM mmol/L  --  141   --   --   --  143 140   POTASSIUM mmol/L  --  3.8 4.0 4.1  --  3.3* 3.6   CHLORIDE mmol/L  --  107  --   --   --  110* 107   CO2 mmol/L  --  26.0  --   --   --  26.0 26.0   BUN mg/dL  --  10  --   --   --  16 20   CREATININE mg/dL  --  0.60  --   --   --  0.82 0.97   GLUCOSE mg/dL  --  85  --   --   --  121* 141*   CALCIUM mg/dL  --  7.5*  --   --   --  7.4* 7.9*     Results from last 7 days   Lab Units 12/03/20  1852   BILIRUBIN mg/dL 0.2   ALK PHOS U/L 66   ALT (SGPT) U/L 7   AST (SGOT) U/L 14           Invalid input(s): TG, LDLCALC, LDLREALC        Brief Urine Lab Results  (Last result in the past 365 days)      Color   Clarity   Blood   Leuk Est   Nitrite   Protein   CREAT   Urine HCG        12/03/20 1951 Dark Yellow  Comment:  Result checked  Turbid  Comment:  Result checked  Large (3+) Large (3+) Negative 100 mg/dL (2+)               Microbiology Results Abnormal     Procedure Component Value - Date/Time    Urine Culture - Urine, Urine, Clean Catch [[id number]]  (Abnormal)  (Susceptibility) Collected: 12/03/20 1837    Lab Status: Final result Specimen: Urine, Clean Catch Updated: 12/06/20 0344     Urine Culture >100,000 CFU/mL Enterococcus faecalis    Susceptibility      Enterococcus faecalis     ANA     Ampicillin Susceptible     Levofloxacin Susceptible     Nitrofurantoin Susceptible     Tetracycline Resistant     Vancomycin Susceptible                    Urine Culture - Urine, Urine, Catheter [[id number]]  (Normal) Collected: 12/03/20 1951    Lab Status: Final result Specimen: Urine, Catheter Updated: 12/05/20 0335     Urine Culture No growth          Test Results Pending at Discharge      Procedures Performed      Consults:   Consults     Date and Time Order Name Status Description    12/4/2020 1330 Inpatient Psychiatrist Consult      12/4/2020 1222 Inpatient Urology Consult Completed     12/3/2020 2017 Hospitalist (on-call MD unless specified) Completed             Discharge Details        Discharge  Medications      New Medications      Instructions Start Date   amoxicillin 250 MG/5ML suspension  Commonly known as: AMOXIL   250 mg, Oral, 3 Times Daily         Continue These Medications      Instructions Start Date   acetaminophen 325 MG tablet  Commonly known as: TYLENOL   650 mg, Oral, Every 4 Hours PRN      aluminum-magnesium hydroxide-simethicone 200-200-20 MG/5ML suspension  Commonly known as: MAALOX/MYLANTA   30 mL, Oral, Every 4 Hours PRN      bisacodyl 10 MG suppository  Commonly known as: DULCOLAX   10 mg, Rectal, Daily PRN      loperamide 2 MG capsule  Commonly known as: IMODIUM   2 mg, Oral, 4 Times Daily PRN      magnesium hydroxide 400 MG/5ML suspension  Commonly known as: MILK OF MAGNESIA   30 mL, Oral, Daily PRN      polyethylene glycol 17 g packet  Commonly known as: MIRALAX   17 g, Oral, Daily PRN      QUEtiapine 50 MG tablet  Commonly known as: SEROquel   50 mg, Oral, 2 Times Daily, Morning and Afternoon       QUEtiapine 100 MG tablet  Commonly known as: SEROquel   100 mg, Oral, Nightly      QUEtiapine 25 MG tablet  Commonly known as: SEROquel   25 mg, Oral, Every 4 Hours PRN             No Known Allergies      Discharge Disposition:  Psychiatric Hospital or Unit ([facility])    Diet:  Hospital:  Diet Order   Procedures   • Diet Texture; Pureed Diet     Discharge Activity:   Activity Instructions     Activity as Tolerated          CODE STATUS:    Code Status and Medical Interventions:   Ordered at: 12/03/20 2127     Code Status:    CPR     Medical Interventions (Level of Support Prior to Arrest):    Full     Follow-up Appointments  No future appointments.    Additional Instructions for the Follow-ups that You Need to Schedule     Call MD With Problems / Concerns   As directed      Instructions: Call [phone] or email [email or web address] for problems or concerns.    Order Comments: Instructions: Call [phone] or email [email or web address] for problems or concerns.           Discharge Follow-up with PCP   As directed       Currently Documented PCP:    Provider, No Known    PCP Phone Number:    None     Follow Up Details: f/u with facility MD or PC of choice in 1 week             Condition on Discharge:      Stable    This patient has been examined wearing appropriate Personal Protective Equipment. 12/06/20      Electronically signed by Geetha De La Cruz MD, 12/06/20, 4:03 PM EST.      Time: I spent  35  minutes on this discharge activity which included face-to-face encounter with the patient/reviewing the data in the system/coordination of the care with the nursing staff as well as consultants/documentation/entering orders.      Electronically signed by Geetha De La Cruz MD at 12/06/20 5115

## 2020-12-06 NOTE — PROGRESS NOTES
Continued Stay Note  HAILEY Mariscal     Patient Name: Haylee Blackwell  MRN: [id number]  Today's Date: 12/5/2020    Admit Date: 12/3/2020    Discharge Plan     Row Name 12/05/20 1935       Plan    Plan  [facility] referral pending    Plan Comments  MSW sent clinical information to Fabi[facility] via fax. Sakshi stated she will review clinicals with facility MD and contact MSW tomorrow with a decision.        Nellie GREENW, LSW  Weekend   Care Management Dept  Cell [phone]  Weekday Department [phone]

## 2020-12-06 NOTE — PROGRESS NOTES
"  Chief complaint: No complaint voiced, banging remote on bed railing    Subjective .     History of present illness:  The patient is a 75 y.o. female who was admitted from [facility]  secondary to hematuria. PMH: dementia, osteoarthritis.   Psych consult was requested by Dr De La Cruz secondary to agitation.  The patient was unable to provide any information secondary significant cognitive decline and agitation, patient was in bed, yelling \"help\".  She did not respond for redirections, unable to answer any questions.  12/5/20, the patient is not currently yelling, but, per nursing, has been doing so most of the day.  She was given Zyprexa IM early this morning, which calmed her for a few hours but she has not slept much in two days.       Past psychiatric history: Dementia    Review of Systems   Unable to perform ROS: Dementia       History       Medications Prior to Admission   Medication Sig Dispense Refill Last Dose   • acetaminophen (TYLENOL) 325 MG tablet Take 650 mg by mouth Every 4 (Four) Hours As Needed for Mild Pain , Headache or Fever.      • aluminum-magnesium hydroxide-simethicone (MAALOX/MYLANTA) 200-200-20 MG/5ML suspension Take 30 mL by mouth Every 4 (Four) Hours As Needed for Indigestion or Heartburn.      • bisacodyl (DULCOLAX) 10 MG suppository Insert 10 mg into the rectum Daily As Needed for Constipation.      • loperamide (IMODIUM) 2 MG capsule Take 2 mg by mouth 4 (Four) Times a Day As Needed for Diarrhea.      • magnesium hydroxide (MILK OF MAGNESIA) 400 MG/5ML suspension Take 30 mL by mouth Daily As Needed for Constipation.      • polyethylene glycol (MIRALAX) 17 g packet Take 17 g by mouth Daily As Needed.      • QUEtiapine (SEROquel) 100 MG tablet Take 100 mg by mouth Every Night.      • QUEtiapine (SEROquel) 25 MG tablet Take 25 mg by mouth Every 4 (Four) Hours As Needed (aggitation).      • QUEtiapine (SEROquel) 50 MG tablet Take 50 mg by mouth 2 (Two) Times a Day. Morning and Afternoon    " "      Scheduled Meds:ampicillin, 1 g, Intravenous, Q6H  cefTRIAXone, 2 g, Intravenous, Q24H  QUEtiapine, 100 mg, Oral, Nightly  QUEtiapine, 50 mg, Oral, BID  sodium chloride, 3 mL, Intravenous, Q12H      Continuous Infusions:   PRN Meds:.•  acetaminophen **OR** acetaminophen **OR** acetaminophen  •  acetaminophen  •  aluminum-magnesium hydroxide-simethicone  •  bisacodyl  •  Calcium Gluconate-NaCl **AND** calcium gluconate **AND** Calcium, Ionized  •  ketorolac  •  loperamide  •  magnesium hydroxide  •  OLANZapine  •  ondansetron **OR** ondansetron  •  polyethylene glycol  •  potassium chloride  •  QUEtiapine  •  [COMPLETED] Insert peripheral IV **AND** sodium chloride  •  sodium chloride  •  ziprasidone (GEODON) injection with sterile water     Allergies:  Patient has no known allergies.      Objective     Vital Signs   /62 (BP Location: Right arm, Patient Position: Lying)   Pulse 59   Temp 97.5 °F (36.4 °C) (Oral)   Resp 20   Ht 165.1 cm (65\")   Wt 57.7 kg (127 lb 3.3 oz)   SpO2 95%   Breastfeeding No   BMI 21.17 kg/m²     Physical Exam:     General Appearance:    NAD     Mental Status Exam:   Hygiene:   fair  Cooperation:  Uncooperative and agitated  Eye Contact:  Poor  Psychomotor Behavior:  Aggitated  Affect:  Labile   Hopelessness: Did not express  Speech:  Poorly articulated, groaning  Thought Process:  Unable to demonstrate  Thought Content:  Unable to demonstrate  Suicidal:  Did not express  Homicidal:  None  Hallucinations:  Not demonstrated today  Delusion:  Unable to demonstrate  Memory:  Unable to evaluate  Orientation:  Unable to evaluate  Reliability:  poor  Insight:  Poor  Judgement:  Impaired  Impulse Control:  Impaired  Physical/Medical Issues:  Yes     Medications and allergies were reviewed by this provider.    Lab Results   Component Value Date    GLUCOSE 85 12/05/2020    CALCIUM 7.5 (L) 12/05/2020     12/05/2020    K 3.8 12/05/2020    CO2 26.0 12/05/2020     12/05/2020 "    BUN 10 12/05/2020    CREATININE 0.60 12/05/2020    EGFRIFNONA 98 12/05/2020    BCR 16.7 12/05/2020    ANIONGAP 8.0 12/05/2020       Last Urine Toxicity     There is no flowsheet data to display.          No results found for: PHENYTOIN, PHENOBARB, VALPROATE, CBMZ    Lab Results   Component Value Date     12/05/2020    BUN 10 12/05/2020    CREATININE 0.60 12/05/2020    WBC 4.00 12/05/2020       Brief Urine Lab Results  (Last result in the past 365 days)      Color   Clarity   Blood   Leuk Est   Nitrite   Protein   CREAT   Urine HCG        12/03/20 1951 Dark Yellow  Comment:  Result checked  Turbid  Comment:  Result checked  Large (3+) Large (3+) Negative 100 mg/dL (2+)               Assessment/Plan       Gross hematuria    Acute UTI (urinary tract infection)         Assessment:   Delirium secondary to medical condition (UTI)  Dementia with behavioral disturbances.    Treatment Plan:   Patient remains agitated, confused.  She did not respond to the Geodon and Lorazepam; discontinue and use Zyprexa 5mg IM twice a day as needed for agitation, helped for a while today.  Patient is to be evaluated by speech therapy for swallowing, she is currently NPO until complete, which means Seroquel is being held, so may need to switch to Risperdal M-tab which will dissolve if continued NPO.  Patient was transferred to [facility] from [facility] and should return there for stabilization before returning to [facility].   Continue to provide support    Treatment Plan discussed with: Nursing    I discussed the patients findings and my recommendations with nursing staff    I have reviewed and approved the behavioral health treatment plans and problem list. Yes     Referring MD has access to consult report and progress notes in EMR     Libra Dhaliwal PA-C  12/05/20  23:59 EST    Patient was seen wearing appropriate PPE.    EMR Dragon transcription disclaimer:  Some of this encounter note is an electronic transcription translation of spoken  language to printed text. The electronic translation of spoken language may permit erroneous, or at times, nonsensical words or phrases to be inadvertently transcribed; Although I have reviewed the note for such errors some may still exist.

## 2020-12-06 NOTE — THERAPY RE-EVALUATION
Acute Care - Speech Language Pathology   Swallow Re-Evaluation  [place]     Patient Name: Haylee Blackwell  : 1945  MRN: [id number]  Today's Date: 2020               Admit Date: 12/3/2020    Visit Dx:     ICD-10-CM ICD-9-CM   1. Gross hematuria  R31.0 599.71   2. Cystitis with hematuria  N30.91 595.9     PPE: Patient was not wearing a face mask during this therapy encounter per swallow evaluation performed. Therapist used appropriate personal protective equipment including mask, eye protection and gloves.  Mask used was standard procedure mask. Appropriate PPE was worn during the entire therapy session. The patient coughed during this evaluation. Therapist was within 6 feet for 15 minutes or more during the evaluation. Hand hygiene was completed before and after therapy session. Patient is not in enhanced droplet precautions.        SWALLOW EVALUATION (last 72 hours)      SLP Adult Swallow Evaluation     Row Name 20 1500       Rehab Evaluation    Document Type  re-evaluation  -AB    Subjective Information  no complaints  -AB    Patient Observations  cooperative  -AB    Patient/Family/Caregiver Comments/Observations  Pt initially sleeping soundly. RN enters room at same time for IV removal, and pt wakes easily. Speech intelligible, but able to state name and follow basic 1 step commands.   -AB    Patient Effort  adequate  -AB       General Information    Patient Profile Reviewed  yes  -AB    Current Method of Nutrition  NPO  -AB       Pain    Additional Documentation  -- pt unable to state  -AB      User Key  (r) = Recorded By, (t) = Taken By, (c) = Cosigned By    Initials Name Effective Dates    AB Sandra Gifford, MS CCC-SLP 10/05/20 -           EDUCATION  The patient has been educated in the following areas:   Dysphagia (Swallowing Impairment).    SLP Recommendation and Plan    SLP will follow for tolerance, carryover of recommendations as appropriate and indicated.    SLP GOALS     Row Name  12/06/20 1500       Oral Nutrition/Hydration Goal 1 (SLP)    Oral Nutrition/Hydration Goal 1, SLP  Pt will tolerate her safest and least restrictive diet without complications from aspiration.   -AB    Time Frame (Oral Nutrition/Hydration Goal 1, SLP)  by discharge  -AB    Barriers (Oral Nutrition/Hydration Goal 1, SLP)  Recommend: puree and thin liquids. Medications while in puree/with thin liquids as tolerated, will likely need to check for clearance or offer liquid wash. Compensations to include: upright for all PO, small bites/sips, assist to full feed.   -AB    Progress/Outcomes (Oral Nutrition/Hydration Goal 1, SLP)  goal ongoing  -AB       Oral Nutrition/Hydration Goal 2 (SLP)    Oral Nutrition/Hydration Goal 2, SLP  Pt will participate in clinical swallow re-eval to determine pt's safest/least restrictive diet.   -AB    Time Frame (Oral Nutrition/Hydration Goal 2, SLP)  3 days  -AB    Barriers (Oral Nutrition/Hydration Goal 2, SLP)  Bedside swallow re-evaluation performed with pt currently NPO. Pt accepting of all trials, which include: ice chips, thins by tsp, thins by straw, puree, and mech soft solids. Oral stage mild to moderately impaired. Labial seal incomplete on spoon presentation, without anterior loss, unable to remove entire bolus from utensil. A-P transport appears timely for liquids and puree. Limited, inefficient anterior mastication of mech soft peaches, with pt eventually swallowing whole. This results in extended cough, mild choking, with expectoration back into oral cavity of unchewed peach, removed via [place] suction. Palpation performed suggestive of adequate laryngeal elevation for liquids and puree. No further s/s aspiration are noted.   -AB    Progress/Outcomes (Oral Nutrition/Hydration Goal 2, SLP)  goal met;goal ongoing  -AB      User Key  (r) = Recorded By, (t) = Taken By, (c) = Cosigned By    Initials Name Provider Type    Sandra Bustamante, MS CCC-SLP Speech and Language  Pathologist             Time Calculation:       Therapy Charges for Today     Code Description Service Date Service Provider Modifiers Qty    [id number]  ST TREATMENT SWALLOW 4 12/6/2020 Sandra Gifford, MS CCC-SLP GN 1               Sandra Gifford MS CCC-SLP  12/6/2020

## 2020-12-06 NOTE — PLAN OF CARE
Goal Outcome Evaluation:  Plan of Care Reviewed With: patient  Progress: no change  Outcome Summary: Bedside swallow re-evaluation performed with pt currently NPO. Pt accepting of all trials, which include: ice chips, thins by tsp, thins by straw, puree, and mech soft solids. Oral stage mild to moderately impaired. Labial seal incomplete on spoon presentation, without anterior loss, unable to remove entire bolus from utensil. A-P transport appears timely for liquids and puree. Limited, inefficient anterior mastication of mech soft peaches, with pt eventually swallowing whole. This results in extended cough, mild choking, with expectoration back into oral cavity of unchewed peach, removed via [place] suction. Palpation performed suggestive of adequate laryngeal elevation for liquids and puree. No further s/s aspiration are noted.     Recommend: puree and thin liquids. Medications while in puree/with thin liquids as tolerated, will likely need to check for clearance or offer liquid wash. Compensations to include: upright for all PO, small bites/sips, assist to full feed.    PPE: Patient was not wearing a face mask during this therapy encounter per swallow evaluation performed. Therapist used appropriate personal protective equipment including mask, eye protection and gloves.  Mask used was standard procedure mask. Appropriate PPE was worn during the entire therapy session. The patient coughed during this evaluation. Therapist was within 6 feet for 15 minutes or more during the evaluation. Hand hygiene was completed before and after therapy session. Patient is not in enhanced droplet precautions.

## 2020-12-06 NOTE — PLAN OF CARE
Goal Outcome Evaluation:    Patient hasn't really slept today, she's been agitated. She pulled her IV out today and has tried tugging at her FC. Patient currently has a sitter at bedside for safety.

## 2020-12-06 NOTE — DISCHARGE PLACEMENT REQUEST
"Haylee Blackwell (75 y.o. Female)     Date of Birth Social Security Number Address Home Phone MRN    1945  [address] [place] [address] [phone] [id number]    Yarsanism Marital Status          None        Admission Date Admission Type Admitting Provider Attending Provider Department, Room/Bed    12/3/20 Emergency Geetha De La Cruz MD Kapadia, Shefali A, MD [facility] SURGICAL INPATIENT, 4104/1    Discharge Date Discharge Disposition Discharge Destination                       Attending Provider: Geetha De La Cruz MD    Allergies: No Known Allergies    Isolation: None   Infection: None   Code Status: CPR    Ht: 165.1 cm (65\")   Wt: 57.7 kg (127 lb 3.3 oz)    Admission Cmt: None   Principal Problem: None                Active Insurance as of 12/3/2020     Primary Coverage     Payor Plan Insurance Group Employer/Plan Group    MEDICARE MEDICARE A & B      Payor Plan Address Payor Plan Phone Number Payor Plan Fax Number Effective Dates    [address] [phone]  9/1/2012 - None Entered    [facility] [address]       Subscriber Name Subscriber Birth Date Member ID       HAYLEE BLACKWELL 1945 [id number]           Secondary Coverage     Payor Plan Insurance Group Employer/Plan Group    [place]      Payor Plan Address Payor Plan Phone Number Payor Plan Fax Number Effective Dates    [address]   1/1/2020 - None Entered    [place] [address]       Subscriber Name Subscriber Birth Date Member ID       HAYLEE BLACKWELL 1945 [id number]                 Emergency Contacts      (Rel.) Home Phone Work Phone Mobile Phone    FREDDIEARIEL (Guardian) [phone] -- [phone]    FRAZIERANA LUISA GODFREY (Son) [phone] -- [phone]               Physician Progress Notes (last 48 hours) (Notes from 12/04/20 1604 through 12/06/20 1604)      Libra Dhaliwal PA-C at 12/05/20 1529            Chief complaint: No complaint voiced, banging remote " "on bed railing    Subjective .     History of present illness:  The patient is a 75 y.o. female who was admitted from [facility]  secondary to hematuria. PMH: dementia, osteoarthritis.   Psych consult was requested by Dr De La Cruz secondary to agitation.  The patient was unable to provide any information secondary significant cognitive decline and agitation, patient was in bed, yelling \"help\".  She did not respond for redirections, unable to answer any questions.  12/5/20, the patient is not currently yelling, but, per nursing, has been doing so most of the day.  She was given Zyprexa IM early this morning, which calmed her for a few hours but she has not slept much in two days.       Past psychiatric history: Dementia    Review of Systems   Unable to perform ROS: Dementia       History       Medications Prior to Admission   Medication Sig Dispense Refill Last Dose   • acetaminophen (TYLENOL) 325 MG tablet Take 650 mg by mouth Every 4 (Four) Hours As Needed for Mild Pain , Headache or Fever.      • aluminum-magnesium hydroxide-simethicone (MAALOX/MYLANTA) 200-200-20 MG/5ML suspension Take 30 mL by mouth Every 4 (Four) Hours As Needed for Indigestion or Heartburn.      • bisacodyl (DULCOLAX) 10 MG suppository Insert 10 mg into the rectum Daily As Needed for Constipation.      • loperamide (IMODIUM) 2 MG capsule Take 2 mg by mouth 4 (Four) Times a Day As Needed for Diarrhea.      • magnesium hydroxide (MILK OF MAGNESIA) 400 MG/5ML suspension Take 30 mL by mouth Daily As Needed for Constipation.      • polyethylene glycol (MIRALAX) 17 g packet Take 17 g by mouth Daily As Needed.      • QUEtiapine (SEROquel) 100 MG tablet Take 100 mg by mouth Every Night.      • QUEtiapine (SEROquel) 25 MG tablet Take 25 mg by mouth Every 4 (Four) Hours As Needed (aggitation).      • QUEtiapine (SEROquel) 50 MG tablet Take 50 mg by mouth 2 (Two) Times a Day. Morning and Afternoon          Scheduled Meds:ampicillin, 1 g, Intravenous, " "Q6H  cefTRIAXone, 2 g, Intravenous, Q24H  QUEtiapine, 100 mg, Oral, Nightly  QUEtiapine, 50 mg, Oral, BID  sodium chloride, 3 mL, Intravenous, Q12H      Continuous Infusions:   PRN Meds:.•  acetaminophen **OR** acetaminophen **OR** acetaminophen  •  acetaminophen  •  aluminum-magnesium hydroxide-simethicone  •  bisacodyl  •  Calcium Gluconate-NaCl **AND** calcium gluconate **AND** Calcium, Ionized  •  ketorolac  •  loperamide  •  magnesium hydroxide  •  OLANZapine  •  ondansetron **OR** ondansetron  •  polyethylene glycol  •  potassium chloride  •  QUEtiapine  •  [COMPLETED] Insert peripheral IV **AND** sodium chloride  •  sodium chloride  •  ziprasidone (GEODON) injection with sterile water     Allergies:  Patient has no known allergies.      Objective     Vital Signs   /62 (BP Location: Right arm, Patient Position: Lying)   Pulse 59   Temp 97.5 °F (36.4 °C) (Oral)   Resp 20   Ht 165.1 cm (65\")   Wt 57.7 kg (127 lb 3.3 oz)   SpO2 95%   Breastfeeding No   BMI 21.17 kg/m²     Physical Exam:     General Appearance:    NAD     Mental Status Exam:   Hygiene:   fair  Cooperation:  Uncooperative and agitated  Eye Contact:  Poor  Psychomotor Behavior:  Aggitated  Affect:  Labile   Hopelessness: Did not express  Speech:  Poorly articulated, groaning  Thought Process:  Unable to demonstrate  Thought Content:  Unable to demonstrate  Suicidal:  Did not express  Homicidal:  None  Hallucinations:  Not demonstrated today  Delusion:  Unable to demonstrate  Memory:  Unable to evaluate  Orientation:  Unable to evaluate  Reliability:  poor  Insight:  Poor  Judgement:  Impaired  Impulse Control:  Impaired  Physical/Medical Issues:  Yes     Medications and allergies were reviewed by this provider.    Lab Results   Component Value Date    GLUCOSE 85 12/05/2020    CALCIUM 7.5 (L) 12/05/2020     12/05/2020    K 3.8 12/05/2020    CO2 26.0 12/05/2020     12/05/2020    BUN 10 12/05/2020    CREATININE 0.60 12/05/2020 "    EGFRIFNONA 98 12/05/2020    BCR 16.7 12/05/2020    ANIONGAP 8.0 12/05/2020       Last Urine Toxicity     There is no flowsheet data to display.          No results found for: PHENYTOIN, PHENOBARB, VALPROATE, CBMZ    Lab Results   Component Value Date     12/05/2020    BUN 10 12/05/2020    CREATININE 0.60 12/05/2020    WBC 4.00 12/05/2020       Brief Urine Lab Results  (Last result in the past 365 days)      Color   Clarity   Blood   Leuk Est   Nitrite   Protein   CREAT   Urine HCG        12/03/20 1951 Dark Yellow  Comment:  Result checked  Turbid  Comment:  Result checked  Large (3+) Large (3+) Negative 100 mg/dL (2+)               Assessment/Plan       Gross hematuria    Acute UTI (urinary tract infection)         Assessment:   Delirium secondary to medical condition (UTI)  Dementia with behavioral disturbances.    Treatment Plan:   Patient remains agitated, confused.  She did not respond to the Geodon and Lorazepam; discontinue and use Zyprexa 5mg IM twice a day as needed for agitation, helped for a while today.  Patient is to be evaluated by speech therapy for swallowing, she is currently NPO until complete, which means Seroquel is being held, so may need to switch to Risperdal M-tab which will dissolve if continued NPO.  Patient was transferred to [facility] from [facility] and should return there for stabilization before returning to [facility].   Continue to provide support    Treatment Plan discussed with: Nursing    I discussed the patients findings and my recommendations with nursing staff    I have reviewed and approved the behavioral health treatment plans and problem list. Yes     Referring MD has access to consult report and progress notes in EMR     Libra Dhaliwal PA-C  12/05/20  23:59 EST    Patient was seen wearing appropriate PPE.    EMR Dragon transcription disclaimer:  Some of this encounter note is an electronic transcription translation of spoken language to printed text. The electronic translation  of spoken language may permit erroneous, or at times, nonsensical words or phrases to be inadvertently transcribed; Although I have reviewed the note for such errors some may still exist.         Electronically signed by Libra Dhaliwal PA-C at 12/06/20 0016       Consult Notes (last 48 hours) (Notes from 12/04/20 1604 through 12/06/20 1604)    No notes of this type exist for this encounter.       Nutrition Notes (last 48 hours) (Notes from 12/04/20 1604 through 12/06/20 1604)    No notes exist for this encounter.       Physical Therapy Notes (last 48 hours) (Notes from 12/04/20 1604 through 12/06/20 1604)    No notes exist for this encounter.       Occupational Therapy Notes (last 48 hours) (Notes from 12/04/20 1604 through 12/06/20 1604)    No notes exist for this encounter.          Speech Language Pathology Notes (last 48 hours) (Notes from 12/04/20 1604 through 12/06/20 1604)      Sandra Gifford MS CCC-SLP at 12/06/20 1556        Goal Outcome Evaluation:  Plan of Care Reviewed With: patient  Progress: no change  Outcome Summary: Bedside swallow re-evaluation performed with pt currently NPO. Pt accepting of all trials, which include: ice chips, thins by tsp, thins by straw, puree, and mech soft solids. Oral stage mild to moderately impaired. Labial seal incomplete on spoon presentation, without anterior loss, unable to remove entire bolus from utensil. A-P transport appears timely for liquids and puree. Limited, inefficient anterior mastication of mech soft peaches, with pt eventually swallowing whole. This results in extended cough, mild choking, with expectoration back into oral cavity of unchewed peach, removed via Yaunker suction. Palpation performed suggestive of adequate laryngeal elevation for liquids and puree. No further s/s aspiration are noted.     Recommend: puree and thin liquids. Medications while in puree/with thin liquids as tolerated, will likely need to check for clearance or offer liquid  wash. Compensations to include: upright for all PO, small bites/sips, assist to full feed.    PPE: Patient was not wearing a face mask during this therapy encounter per swallow evaluation performed. Therapist used appropriate personal protective equipment including mask, eye protection and gloves.  Mask used was standard procedure mask. Appropriate PPE was worn during the entire therapy session. The patient coughed during this evaluation. Therapist was within 6 feet for 15 minutes or more during the evaluation. Hand hygiene was completed before and after therapy session. Patient is not in enhanced droplet precautions.     Electronically signed by Sandra Gifford, MS CCC-SLP at 20 1558     Sandra Gifford, MS CCC-SLP at 20 9431          Acute Care - Speech Language Pathology   Swallow Re-Evaluation  Davey     Patient Name: Haylee Blackwell  : 1945  MRN: [id number]  Today's Date: 2020               Admit Date: 12/3/2020    Visit Dx:     ICD-10-CM ICD-9-CM   1. Gross hematuria  R31.0 599.71   2. Cystitis with hematuria  N30.91 595.9     PPE: Patient was not wearing a face mask during this therapy encounter per swallow evaluation performed. Therapist used appropriate personal protective equipment including mask, eye protection and gloves.  Mask used was standard procedure mask. Appropriate PPE was worn during the entire therapy session. The patient coughed during this evaluation. Therapist was within 6 feet for 15 minutes or more during the evaluation. Hand hygiene was completed before and after therapy session. Patient is not in enhanced droplet precautions.        SWALLOW EVALUATION (last 72 hours)      SLP Adult Swallow Evaluation     Row Name 20 1500       Rehab Evaluation    Document Type  re-evaluation  -AB    Subjective Information  no complaints  -AB    Patient Observations  cooperative  -AB    Patient/Family/Caregiver Comments/Observations  Pt initially sleeping soundly. RN enters  room at same time for IV removal, and pt wakes easily. Speech intelligible, but able to state name and follow basic 1 step commands.   -AB    Patient Effort  adequate  -AB       General Information    Patient Profile Reviewed  yes  -AB    Current Method of Nutrition  NPO  -AB       Pain    Additional Documentation  -- pt unable to state  -AB      User Key  (r) = Recorded By, (t) = Taken By, (c) = Cosigned By    Initials Name Effective Dates    AB Sandra Gifford, MS CCC-SLP 10/05/20 -           EDUCATION  The patient has been educated in the following areas:   Dysphagia (Swallowing Impairment).    SLP Recommendation and Plan    SLP will follow for tolerance, carryover of recommendations as appropriate and indicated.    SLP GOALS     Row Name 12/06/20 1500       Oral Nutrition/Hydration Goal 1 (SLP)    Oral Nutrition/Hydration Goal 1, SLP  Pt will tolerate her safest and least restrictive diet without complications from aspiration.   -AB    Time Frame (Oral Nutrition/Hydration Goal 1, SLP)  by discharge  -AB    Barriers (Oral Nutrition/Hydration Goal 1, SLP)  Recommend: puree and thin liquids. Medications while in puree/with thin liquids as tolerated, will likely need to check for clearance or offer liquid wash. Compensations to include: upright for all PO, small bites/sips, assist to full feed.   -AB    Progress/Outcomes (Oral Nutrition/Hydration Goal 1, SLP)  goal ongoing  -AB       Oral Nutrition/Hydration Goal 2 (SLP)    Oral Nutrition/Hydration Goal 2, SLP  Pt will participate in clinical swallow re-eval to determine pt's safest/least restrictive diet.   -AB    Time Frame (Oral Nutrition/Hydration Goal 2, SLP)  3 days  -AB    Barriers (Oral Nutrition/Hydration Goal 2, SLP)  Bedside swallow re-evaluation performed with pt currently NPO. Pt accepting of all trials, which include: ice chips, thins by tsp, thins by straw, puree, and mech soft solids. Oral stage mild to moderately impaired. Labial seal incomplete on  spoon presentation, without anterior loss, unable to remove entire bolus from utensil. A-P transport appears timely for liquids and puree. Limited, inefficient anterior mastication of mech soft peaches, with pt eventually swallowing whole. This results in extended cough, mild choking, with expectoration back into oral cavity of unchewed peach, removed via Yaunker suction. Palpation performed suggestive of adequate laryngeal elevation for liquids and puree. No further s/s aspiration are noted.   -AB    Progress/Outcomes (Oral Nutrition/Hydration Goal 2, SLP)  goal met;goal ongoing  -AB      User Key  (r) = Recorded By, (t) = Taken By, (c) = Cosigned By    Initials Name Provider Type    AB Sandra Gifford, MS CCC-SLP Speech and Language Pathologist             Time Calculation:       Therapy Charges for Today     Code Description Service Date Service Provider Modifiers Qty    [id number]  ST TREATMENT SWALLOW 4 2020 Sandra Gifford, MS RAMIREZ-SLP GN 1               Sandra Gifford MS CCC-SLP  2020    Electronically signed by Sandra Gifford, MS CCC-SLP at 20 1558     Mariajose Franklin at 20 1657          [facility] Speech Language Pathology   Swallow Treatment Note/Discharge    Davey     Patient Name: Haylee Blackwell  : 1945  MRN: [id number]  Today's Date: 2020               Admit Date: 12/3/2020    Visit Dx:      ICD-10-CM ICD-9-CM   1. Gross hematuria  R31.0 599.71   2. Cystitis with hematuria  N30.91 595.9     Patient Active Problem List   Diagnosis   • Gross hematuria   • Acute UTI (urinary tract infection)           Patient participated in a clinical swallow re-evaluation this date. Pt agitated upon arrival w/ nursing aid present. Pt agreeable to PO trials following multiple repetitions of SLP's introduction. Pt accepted water trials from spoon x3. No overt s/s of aspiration across 3/3 trials. Following 3rd trial, pt became agitated and threw cup. No further consistencies  or trials administered d/t increased patient agitation.     Recommendations: Continued clear liquid diet w/ medications delivered as tolerated. ST will continue to follow to complete swallow re-evaluation with further recommendations as indicated and appropriate.       PPE worn: gloves, goggles, mask. Hand hygiene was performed prior to and after patient encounter.      SLP GOALS     Row Name 12/05/20 1600    Oral Nutrition/Hydration Goal 2, SLP  Pt will participate in clinical swallow re-eval to determine pt's safest/least restrictive diet. See above note.     -KL    Time Frame (Oral Nutrition/Hydration Goal 2, SLP)  3 days  -KL      User Key  (r) = Recorded By, (t) = Taken By, (c) = Cosigned By    Initials Name Provider Type    Mariajose Calderon Speech and Language Pathologist          EDUCATION  The patient has been educated in the following areas:   Oral Care/Hydration Modified Diet Instruction.                  Mariajose Franklin  12/5/2020    Electronically signed by Mariajose Franklin at 12/05/20 1710       Respiratory Therapy Notes (last 48 hours) (Notes from 12/04/20 1604 through 12/06/20 1604)    No notes exist for this encounter.

## 2020-12-06 NOTE — DISCHARGE SUMMARY
[facility] Medicine Services  DISCHARGE SUMMARY        Prepared For PCP:  Provider, No Known    Patient Name: Haylee Blackwell  : 1945  MRN: [id number]      Date of Admission:   12/3/2020    Date of Discharge:  2020    Length of stay:  LOS: 2 days     Hospital Course     Presenting Problem:   Cystitis with hematuria [N30.91]  Gross hematuria [R31.0]  Gross hematuria [R31.0]      Active Hospital Problems    Diagnosis  POA   • Acute UTI (urinary tract infection) [N39.0]  Yes      Resolved Hospital Problems    Diagnosis Date Resolved POA   • Gross hematuria [R31.0] 2020 Yes           Hospital Course:  Haylee Blackwell is a 75 y.o. female brought from [facility] for hematuria. She has recent PMH of dementia with behavioral disturbance. She has been at [facility] since 20. She is awake but answers inappropriately . Per review of records from [facility] she seems to be at baseline and has PMH of combativeness. She is cooperative with exam but no information can be obtained by patient .She is afebrile , serum labs show Hgb 7.9 with no comparison, glucose 141 and Ca 7.9.  UA does show 3+ bacteria and large blood. She was started on IV Ceftriaxone in ED with urine culture pending and given 1L IVF hydration. PMH per records of arthritis and constipation. She will be admitted for further evaluation and treatment . No observed symptoms suggestive of Covid-19. She arrived with a Treviño catheter which was changed in ED.    Patient was noted to have gross hematuria and was evaluated by urology who felt the hematuria was solely due to UTI. Urine culture grew out an Enterococcus faecalis that was sensitive to all but tetracyclines. She was treated with IV ampicillin during the admission.  She had some altered mentation, but that cleared up and she became more alert once the proper antibiotic was started.  She is being switch to oral ampicillin, and can return to [facility]  upon discharge.  She did have a low Hgb of 7.0 and one unit of blood was transfused, and she stabilized with a Hgb of 8.2.  Although she came to our facility with a Treviño catheter in place, Maribel asked that it be removed prior to discharge. The catheter was removed and the patient passed a voiding trial.  She is now discharged in good condition back to her facility.    Day of Discharge     HPI: No acute events. Pt has stated no new complaints, but she is still difficult to understand.    Vital Signs:   Temp:  [97.3 °F (36.3 °C)-98.4 °F (36.9 °C)] 97.3 °F (36.3 °C)  Heart Rate:  [50-65] 50  Resp:  [12-20] 12  BP: (133-163)/(56-63) 149/56     Physical Exam:  General: well-developed and well-nourished, NAD  HEENT: NC/AT, EOMI, PERRLA  Heart: RRR. No murmur   Chest: CTAB, no w/r/r, normal respiratory effort  Abdominal: Soft. NT/ND. Bowel sounds present  Musculoskeletal: Normal ROM.  No edema. No calf tenderness.  Neurological: awake, alert, disoriented, incoherent  Skin: Skin is warm and dry. No rash  Psychiatric: confused, agitated    Pertinent  and/or Most Recent Results     Results from last 7 days   Lab Units 12/06/20  1542 12/05/20  0924 12/04/20  2030 12/04/20  1253 12/04/20  1104 12/04/20  0231 12/03/20  1852   WBC 10*3/mm3 3.20* 4.00  --   --   --  3.60 3.90   HEMOGLOBIN g/dL 8.3* 8.2*  --   --  7.0* 7.5* 7.9*   HEMATOCRIT % 27.0* 25.5*  --   --  22.4* 23.7* 25.9*   PLATELETS 10*3/mm3 229 297  --   --   --  245 280   SODIUM mmol/L  --  141  --   --   --  143 140   POTASSIUM mmol/L  --  3.8 4.0 4.1  --  3.3* 3.6   CHLORIDE mmol/L  --  107  --   --   --  110* 107   CO2 mmol/L  --  26.0  --   --   --  26.0 26.0   BUN mg/dL  --  10  --   --   --  16 20   CREATININE mg/dL  --  0.60  --   --   --  0.82 0.97   GLUCOSE mg/dL  --  85  --   --   --  121* 141*   CALCIUM mg/dL  --  7.5*  --   --   --  7.4* 7.9*     Results from last 7 days   Lab Units 12/03/20  1852   BILIRUBIN mg/dL 0.2   ALK PHOS U/L 66   ALT (SGPT)  U/L 7   AST (SGOT) U/L 14           Invalid input(s): TG, LDLCALC, LDLREALC        Brief Urine Lab Results  (Last result in the past 365 days)      Color   Clarity   Blood   Leuk Est   Nitrite   Protein   CREAT   Urine HCG        12/03/20 1951 Dark Yellow  Comment:  Result checked  Turbid  Comment:  Result checked  Large (3+) Large (3+) Negative 100 mg/dL (2+)               Microbiology Results Abnormal     Procedure Component Value - Date/Time    Urine Culture - Urine, Urine, Clean Catch [[id number]]  (Abnormal)  (Susceptibility) Collected: 12/03/20 1837    Lab Status: Final result Specimen: Urine, Clean Catch Updated: 12/06/20 0344     Urine Culture >100,000 CFU/mL Enterococcus faecalis    Susceptibility      Enterococcus faecalis     ANA     Ampicillin Susceptible     Levofloxacin Susceptible     Nitrofurantoin Susceptible     Tetracycline Resistant     Vancomycin Susceptible                    Urine Culture - Urine, Urine, Catheter [[id number]]  (Normal) Collected: 12/03/20 1951    Lab Status: Final result Specimen: Urine, Catheter Updated: 12/05/20 0335     Urine Culture No growth          Test Results Pending at Discharge      Procedures Performed      Consults:   Consults     Date and Time Order Name Status Description    12/4/2020 1330 Inpatient Psychiatrist Consult      12/4/2020 1222 Inpatient Urology Consult Completed     12/3/2020 2017 Hospitalist (on-call MD unless specified) Completed             Discharge Details        Discharge Medications      New Medications      Instructions Start Date   amoxicillin 250 MG/5ML suspension  Commonly known as: AMOXIL   250 mg, Oral, 3 Times Daily         Continue These Medications      Instructions Start Date   acetaminophen 325 MG tablet  Commonly known as: TYLENOL   650 mg, Oral, Every 4 Hours PRN      aluminum-magnesium hydroxide-simethicone 200-200-20 MG/5ML suspension  Commonly known as: MAALOX/MYLANTA   30 mL, Oral, Every 4 Hours PRN      bisacodyl 10 MG  suppository  Commonly known as: DULCOLAX   10 mg, Rectal, Daily PRN      loperamide 2 MG capsule  Commonly known as: IMODIUM   2 mg, Oral, 4 Times Daily PRN      magnesium hydroxide 400 MG/5ML suspension  Commonly known as: MILK OF MAGNESIA   30 mL, Oral, Daily PRN      polyethylene glycol 17 g packet  Commonly known as: MIRALAX   17 g, Oral, Daily PRN      QUEtiapine 50 MG tablet  Commonly known as: SEROquel   50 mg, Oral, 2 Times Daily, Morning and Afternoon       QUEtiapine 100 MG tablet  Commonly known as: SEROquel   100 mg, Oral, Nightly      QUEtiapine 25 MG tablet  Commonly known as: SEROquel   25 mg, Oral, Every 4 Hours PRN             No Known Allergies      Discharge Disposition:  Psychiatric Hospital or Unit ([facility])    Diet:  Hospital:  Diet Order   Procedures   • Diet Texture; Pureed Diet     Discharge Activity:   Activity Instructions     Activity as Tolerated          CODE STATUS:    Code Status and Medical Interventions:   Ordered at: 12/03/20 2127     Code Status:    CPR     Medical Interventions (Level of Support Prior to Arrest):    Full     Follow-up Appointments  No future appointments.    Additional Instructions for the Follow-ups that You Need to Schedule     Call MD With Problems / Concerns   As directed      Instructions: Call [phone] or email [email or web address] for problems or concerns.    Order Comments: Instructions: Call [phone] or email [email or web address] for problems or concerns.          Discharge Follow-up with PCP   As directed       Currently Documented PCP:    Provider, No Known    PCP Phone Number:    None     Follow Up Details: f/u with facility MD or PC of choice in 1 week             Condition on Discharge:      Stable    This patient has been examined wearing appropriate Personal Protective Equipment. 12/06/20      Electronically signed by Geetha De La Cruz MD, 12/06/20, 4:03 PM EST.      Time: I spent  35  minutes on this discharge  activity which included face-to-face encounter with the patient/reviewing the data in the system/coordination of the care with the nursing staff as well as consultants/documentation/entering orders.

## 2020-12-06 NOTE — PROGRESS NOTES
"      [facility] Medicine Services Daily Progress Note          Hospitalist Team  LOS 2 days      Patient Care Team:  Provider, No Known as PCP - General    Patient Location: 4104/1      Subjective   Subjective     Chief Complaint / Subjective  Chief Complaint   Patient presents with   • Blood in Urine     blood in urine,          Brief Synopsis of Hospital Course/HPI  75 year old female brought from [facility] for hematuria. She has recent PMH of dementia with behavioral disturbance. She has been at [facility] since 11/17/20. She is awake but answers inappropriately . Per review of records from [facility] she seems to be at baseline and has PMH of combativeness. She is cooperative with exam but no information can be obtained by patient .She is afebrile , serum labs show Hgb 7.9 with no comparison, glucose 141 and Ca 7.9.  UA does show 3+ bacteria and large blood. She was started on IV Ceftriaxone in ED with urine culture pending and given 1L IVF hydration. PMH per records of arthritis and constipation. She will be admitted for further evaluation and treatment . No observed symptoms suggestive of Covid-19. She arrived with a Treviño catheter which was changed in ED.    Date::    12/4/2020  Pt seen and examined. She is obtunded and incoherent, unable to provide any meaningful interaction.    12/5/2020  Pt is awake and alert, but incoherent and confused.  Per nurse, she still calls out and screams at times. However, she is able to follow commands     Review of Systems   Unable to perform ROS: psychiatric disorder         Objective   Objective      Vital Signs  Temp:  [97.3 °F (36.3 °C)-98.4 °F (36.9 °C)] 98.4 °F (36.9 °C)  Heart Rate:  [59-65] 65  Resp:  [16-20] 16  BP: (133-163)/(57-63) 133/63  Oxygen Therapy  SpO2: 93 %  Pulse Oximetry Type: Continuous  Device (Oxygen Therapy): room air  Flow (L/min): 2  Flowsheet Rows      First Filed Value   Admission Height  165.1 cm (65\") " Documented at 12/03/2020 1750   Admission Weight  57.7 kg (127 lb 3.3 oz) Documented at 12/03/2020 1750        Intake & Output (last 3 days)       12/03 0701 - 12/04 0700 12/04 0701 - 12/05 0700 12/05 0701 - 12/06 0700 12/06 0701 - 12/07 0700    P.O.  0 0     I.V. (mL/kg)  3335.8 (57.8)      IV Piggyback  100      Total Intake(mL/kg)  3435.8 (59.5) 0 (0)     Urine (mL/kg/hr) 350 1900 (1.4) 1575 (1.1)     Total Output 350 1900 1575     Net -350 +1535.8 -1575                 Lines, Drains & Airways    Active LDAs     Name:   Placement date:   Placement time:   Site:   Days:    Peripheral IV 12/03/20 1859 Right Antecubital   12/03/20 1859    Antecubital   less than 1    Urethral Catheter   12/04/20    0030     less than 1                  Physical Exam:  General: well-developed and well-nourished, NAD  HEENT: NC/AT, EOMI, PERRLA  Heart: RRR. No murmur   Chest: CTAB, no w/r/r, normal respiratory effort  Abdominal: Soft. NT/ND. Bowel sounds present  Musculoskeletal: Normal ROM.  No edema. No calf tenderness.  Neurological: awake, alert, incoherent  Skin: Skin is warm and dry. No rash  Psychiatric: confused, agitated        Procedures:           Results Review:     I reviewed the patient's new clinical results.    Results from last 7 days   Lab Units 12/05/20  0924 12/04/20  1104 12/04/20  0231 12/03/20  1852   WBC 10*3/mm3 4.00  --  3.60 3.90   HEMOGLOBIN g/dL 8.2* 7.0* 7.5* 7.9*   HEMATOCRIT % 25.5* 22.4* 23.7* 25.9*   PLATELETS 10*3/mm3 297  --  245 280     Results from last 7 days   Lab Units 12/05/20  0924 12/04/20  2030 12/04/20  1253 12/04/20  0231 12/03/20  1852   SODIUM mmol/L 141  --   --  143 140   POTASSIUM mmol/L 3.8 4.0 4.1 3.3* 3.6   CHLORIDE mmol/L 107  --   --  110* 107   CO2 mmol/L 26.0  --   --  26.0 26.0   BUN mg/dL 10  --   --  16 20   CREATININE mg/dL 0.60  --   --  0.82 0.97   GLUCOSE mg/dL 85  --   --  121* 141*   ALBUMIN g/dL  --   --   --   --  2.00*   BILIRUBIN mg/dL  --   --   --   --  0.2    ALK PHOS U/L  --   --   --   --  66   AST (SGOT) U/L  --   --   --   --  14   ALT (SGPT) U/L  --   --   --   --  7   LIPASE U/L  --   --   --   --  22   CALCIUM mg/dL 7.5*  --   --  7.4* 7.9*     Cr Clearance Estimated Creatinine Clearance: 57 mL/min (by C-G formula based on SCr of 0.6 mg/dL).    Coag       HbA1C No results found for: HGBA1C  Blood Glucose       Troponin     Lipids  No results found for: CHOL, CHLPL, TRIG, HDL, LDL, LDLDIRECT    UA    Results from last 7 days   Lab Units 12/03/20 1951   NITRITE UA  Negative   WBC UA /HPF Too Numerous to Count*   BACTERIA UA /HPF 3+*   SQUAM EPITHEL UA /HPF None Seen   URINECX  No growth       Microbiology   Results from last 7 days   Lab Units 12/03/20 1951 12/03/20 1837   URINECX  No growth >100,000 CFU/mL Enterococcus faecalis*     Microbiology Results (last 10 days)     Procedure Component Value - Date/Time    Urine Culture - Urine, Urine, Catheter [[id number]]  (Normal) Collected: 12/03/20 1951    Lab Status: Final result Specimen: Urine, Catheter Updated: 12/05/20 0335     Urine Culture No growth    Urine Culture - Urine, Urine, Clean Catch [[id number]]  (Abnormal)  (Susceptibility) Collected: 12/03/20 1837    Lab Status: Final result Specimen: Urine, Clean Catch Updated: 12/06/20 0344     Urine Culture >100,000 CFU/mL Enterococcus faecalis    Susceptibility      Enterococcus faecalis     ANA     Ampicillin Susceptible     Levofloxacin Susceptible     Nitrofurantoin Susceptible     Tetracycline Resistant     Vancomycin Susceptible                          ABG        EKG  No orders to display       Imaging:  No radiology results for the last 7 days          Xrays, labs reviewed personally by physician.    Medication Review:   I have reviewed the patient's current medication list      Scheduled Meds  ampicillin, 1 g, Intravenous, Q6H  cefTRIAXone, 2 g, Intravenous, Q24H  QUEtiapine, 100 mg, Oral, Nightly  QUEtiapine, 50 mg, Oral, BID  sodium chloride, 3 mL,  Intravenous, Q12H        Meds Infusions       Meds PRN  •  acetaminophen **OR** acetaminophen **OR** acetaminophen  •  acetaminophen  •  aluminum-magnesium hydroxide-simethicone  •  bisacodyl  •  Calcium Gluconate-NaCl **AND** calcium gluconate **AND** Calcium, Ionized  •  ketorolac  •  loperamide  •  magnesium hydroxide  •  OLANZapine  •  ondansetron **OR** ondansetron  •  polyethylene glycol  •  potassium chloride  •  QUEtiapine  •  [COMPLETED] Insert peripheral IV **AND** sodium chloride  •  sodium chloride  •  ziprasidone (GEODON) injection with sterile water      Assessment/Plan   Assessment/Plan     Active Hospital Problems    Diagnosis  POA   • Gross hematuria [R31.0]  Yes   • Acute UTI (urinary tract infection) [N39.0]  Yes      Resolved Hospital Problems   No resolved problems to display.       MEDICAL DECISION MAKING COMPLEXITY BY PROBLEM:     UTI--Catheter associated  -Present on admission  -New Treviño placed in ED; previous Treviño catheter discontinued  -UA suggestive of UTI with 3+ bacteria  -Urine culture with Enterococcus faecalis  -- d/c rocephin, start on ampicillin IV, plan oral abx at discharge    Gross hematuria  -Hgb down to 7.0  -Likely just due to UTI, however UTI should not cause drop in hemoglobin  -Consult urology for further evaluation    Normocytic anemia  -HgbA1c 7.9 on admission, down to 7.5, now 7.0  -12/4/2020 Type and screen and transfuse 1 unit pRBCs  --12/5 Hgb 8.2  -Continue to monitor CBC/H&H    Dementia with behavioral disturbance  -Patient is an inpatient at [facility]  -Continue Seroquel  -1 mg Ativan every 6 hours as needed for agitation  -Consult psychiatry for additional medication recommendations    Hypocalcemia  -Ca 7.9; ionized calcium 1.11  -Replacement as per protocol    Chronic constipation  -Continue PRN meds    VTE Prophylaxis  Mechanical Order History:      Ordered        12/03/20 2350  Place Sequential Compression Device  Once         12/03/20  2350  Maintain Sequential Compression Device  Continuous                 Pharmalogical Order History:     None          Code Status  Code Status and Medical Interventions:   Ordered at: 12/03/20 2127     Code Status:    CPR     Medical Interventions (Level of Support Prior to Arrest):    Full       This patient has been examined wearing appropriate Personal Protective Equipment. 12/06/20      Discharge Planning    Plan to discharge back to [facility] tomorrow.      Electronically signed by Geetha De La Cruz MD, 12/06/20, 09:22 EST.    [facility] Hospitalist Team

## 2020-12-06 NOTE — PLAN OF CARE
Goal Outcome Evaluation:  Plan of Care Reviewed With: patient  Progress: no change     Had refused majority of her care . Has rested better tonight. Will continue to monitor.

## 2020-12-06 NOTE — PLAN OF CARE
Goal Outcome Evaluation:    Patient's FC removed for voiding trial, may return to inpatient psych today.

## 2020-12-07 NOTE — PROGRESS NOTES
Case Management Discharge Note      Final Note: DC to [facility]         Selected Continued Care - Discharged on 12/6/2020 Admission date: 12/3/2020 - Discharge disposition: Psychiatric Hospital or Unit ([place] [facility])    Destination Coordination complete    Service Provider Selected Services Address Phone Fax Patient Preferred    [facility] [address] [place] [address] [phone] -- --                       Final Discharge Disposition Code: 65 - psychiatric hospital or unit

## 2020-12-10 ENCOUNTER — HOSPITAL ENCOUNTER (EMERGENCY)
Facility: HOSPITAL | Age: 75
Discharge: NURSING, REHAB OR LONG-TERM CARE | End: 2020-12-10
Attending: EMERGENCY MEDICINE | Admitting: EMERGENCY MEDICINE

## 2020-12-10 VITALS
RESPIRATION RATE: 12 BRPM | BODY MASS INDEX: 19.25 KG/M2 | TEMPERATURE: 97 F | SYSTOLIC BLOOD PRESSURE: 151 MMHG | HEIGHT: 68 IN | HEART RATE: 64 BPM | OXYGEN SATURATION: 95 % | DIASTOLIC BLOOD PRESSURE: 65 MMHG | WEIGHT: 127 LBS

## 2020-12-10 DIAGNOSIS — R41.82 ALTERED MENTAL STATUS, UNSPECIFIED ALTERED MENTAL STATUS TYPE: Primary | ICD-10-CM

## 2020-12-10 DIAGNOSIS — N39.0 URINARY TRACT INFECTION WITHOUT HEMATURIA, SITE UNSPECIFIED: ICD-10-CM

## 2020-12-10 LAB
ANION GAP SERPL CALCULATED.3IONS-SCNC: 4 MMOL/L (ref 5–15)
ANISOCYTOSIS BLD QL: ABNORMAL
BACTERIA UR QL AUTO: ABNORMAL /HPF
BILIRUB UR QL STRIP: NEGATIVE
BUN SERPL-MCNC: 9 MG/DL (ref 8–23)
BUN/CREAT SERPL: 13.6 (ref 7–25)
CALCIUM SPEC-SCNC: 7.4 MG/DL (ref 8.6–10.5)
CHLORIDE SERPL-SCNC: 107 MMOL/L (ref 98–107)
CLARITY UR: ABNORMAL
CO2 SERPL-SCNC: 29 MMOL/L (ref 22–29)
COLOR UR: ABNORMAL
CREAT SERPL-MCNC: 0.66 MG/DL (ref 0.57–1)
DEPRECATED RDW RBC AUTO: 54.7 FL (ref 37–54)
ERYTHROCYTE [DISTWIDTH] IN BLOOD BY AUTOMATED COUNT: 19.9 % (ref 12.3–15.4)
GFR SERPL CREATININE-BSD FRML MDRD: 88 ML/MIN/1.73
GLUCOSE SERPL-MCNC: 75 MG/DL (ref 65–99)
GLUCOSE UR STRIP-MCNC: NEGATIVE MG/DL
HCT VFR BLD AUTO: 25.5 % (ref 34–46.6)
HGB BLD-MCNC: 8 G/DL (ref 12–15.9)
HGB UR QL STRIP.AUTO: ABNORMAL
HOLD SPECIMEN: NORMAL
HOLD SPECIMEN: NORMAL
HYALINE CASTS UR QL AUTO: ABNORMAL /LPF
KETONES UR QL STRIP: ABNORMAL
LEUKOCYTE ESTERASE UR QL STRIP.AUTO: ABNORMAL
LYMPHOCYTES # BLD MANUAL: 1.1 10*3/MM3 (ref 0.7–3.1)
LYMPHOCYTES NFR BLD MANUAL: 2 % (ref 5–12)
LYMPHOCYTES NFR BLD MANUAL: 44 % (ref 19.6–45.3)
MCH RBC QN AUTO: 24.4 PG (ref 26.6–33)
MCHC RBC AUTO-ENTMCNC: 31.2 G/DL (ref 31.5–35.7)
MCV RBC AUTO: 78 FL (ref 79–97)
MICROCYTES BLD QL: ABNORMAL
MONOCYTES # BLD AUTO: 0.05 10*3/MM3 (ref 0.1–0.9)
NEUTROPHILS # BLD AUTO: 1.23 10*3/MM3 (ref 1.7–7)
NEUTROPHILS NFR BLD MANUAL: 45 % (ref 42.7–76)
NEUTS BAND NFR BLD MANUAL: 4 % (ref 0–5)
NITRITE UR QL STRIP: NEGATIVE
PH UR STRIP.AUTO: 6 [PH] (ref 5–8)
PLATELET # BLD AUTO: 138 10*3/MM3 (ref 140–450)
PMV BLD AUTO: 7.4 FL (ref 6–12)
POIKILOCYTOSIS BLD QL SMEAR: ABNORMAL
POTASSIUM SERPL-SCNC: 3.3 MMOL/L (ref 3.5–5.2)
PROT UR QL STRIP: ABNORMAL
RBC # BLD AUTO: 3.27 10*6/MM3 (ref 3.77–5.28)
RBC # UR: ABNORMAL /HPF
REF LAB TEST METHOD: ABNORMAL
SCAN SLIDE: NORMAL
SMALL PLATELETS BLD QL SMEAR: ABNORMAL
SMUDGE CELLS BLD QL SMEAR: ABNORMAL
SODIUM SERPL-SCNC: 140 MMOL/L (ref 136–145)
SP GR UR STRIP: 1.01 (ref 1–1.03)
SQUAMOUS #/AREA URNS HPF: ABNORMAL /HPF
UROBILINOGEN UR QL STRIP: ABNORMAL
VARIANT LYMPHS NFR BLD MANUAL: 5 % (ref 0–5)
WBC # BLD AUTO: 2.5 10*3/MM3 (ref 3.4–10.8)
WBC UR QL AUTO: ABNORMAL /HPF
WHOLE BLOOD HOLD SPECIMEN: NORMAL
WHOLE BLOOD HOLD SPECIMEN: NORMAL

## 2020-12-10 PROCEDURE — 99284 EMERGENCY DEPT VISIT MOD MDM: CPT

## 2020-12-10 PROCEDURE — 25010000002 CEFTRIAXONE PER 250 MG: Performed by: EMERGENCY MEDICINE

## 2020-12-10 PROCEDURE — 96372 THER/PROPH/DIAG INJ SC/IM: CPT

## 2020-12-10 PROCEDURE — 81001 URINALYSIS AUTO W/SCOPE: CPT | Performed by: EMERGENCY MEDICINE

## 2020-12-10 PROCEDURE — 85025 COMPLETE CBC W/AUTO DIFF WBC: CPT | Performed by: EMERGENCY MEDICINE

## 2020-12-10 PROCEDURE — 93005 ELECTROCARDIOGRAM TRACING: CPT | Performed by: EMERGENCY MEDICINE

## 2020-12-10 PROCEDURE — 80048 BASIC METABOLIC PNL TOTAL CA: CPT | Performed by: EMERGENCY MEDICINE

## 2020-12-10 PROCEDURE — 85007 BL SMEAR W/DIFF WBC COUNT: CPT | Performed by: EMERGENCY MEDICINE

## 2020-12-10 PROCEDURE — P9612 CATHETERIZE FOR URINE SPEC: HCPCS

## 2020-12-10 PROCEDURE — 87086 URINE CULTURE/COLONY COUNT: CPT | Performed by: EMERGENCY MEDICINE

## 2020-12-10 RX ORDER — CEFDINIR 300 MG/1
300 CAPSULE ORAL 2 TIMES DAILY
Qty: 10 CAPSULE | Refills: 0 | Status: SHIPPED | OUTPATIENT
Start: 2020-12-10

## 2020-12-10 RX ADMIN — LIDOCAINE HYDROCHLORIDE 1 G: 10 INJECTION, SOLUTION EPIDURAL; INFILTRATION; INTRACAUDAL; PERINEURAL at 17:58

## 2020-12-10 NOTE — ED PROVIDER NOTES
Subjective   Patient is a 75-year-old female sent from her care center due to increased confusion.  They states she was also bradycardic and hypotensive with poor responsiveness.  Upon arrival to the ED the patient is awake and moderately confused but answers questions sometimes appropriately.  She offers no complaints.          Review of Systems  Unable to be obtained due to the patient's dementia  Past Medical History:   Diagnosis Date   • Dementia (CMS/HCC)    • Osteoarthritis        No Known Allergies    No past surgical history on file.    Family History   Family history unknown: Yes       Social History     Socioeconomic History   • Marital status:      Spouse name: Not on file   • Number of children: Not on file   • Years of education: Not on file   • Highest education level: Not on file   Tobacco Use   • Smoking status: Never Smoker   • Smokeless tobacco: Never Used   • Tobacco comment: Pt unable to verify   Substance and Sexual Activity   • Alcohol use: Defer   • Drug use: Defer   • Sexual activity: Defer           Objective   Physical Exam  Neurologic exam is nonfocal.  HEENT exam shows TMs to be clear.  Oropharynx comers but sclera nonicteric.  Neck has no adenopathy JVD or bruits.  Lungs are clear.  Heart has regular rate rhythm without murmur rub or gallop.  Chest is nontender.  Abdomen is soft nontender.  Extremity exam is no cyanosis or edema.  Procedures           ED Course      Results for orders placed or performed during the hospital encounter of 12/10/20   Basic Metabolic Panel    Specimen: Blood   Result Value Ref Range    Glucose 75 65 - 99 mg/dL    BUN 9 8 - 23 mg/dL    Creatinine 0.66 0.57 - 1.00 mg/dL    Sodium 140 136 - 145 mmol/L    Potassium 3.3 (L) 3.5 - 5.2 mmol/L    Chloride 107 98 - 107 mmol/L    CO2 29.0 22.0 - 29.0 mmol/L    Calcium 7.4 (L) 8.6 - 10.5 mg/dL    eGFR Non African Amer 88 >60 mL/min/1.73    BUN/Creatinine Ratio 13.6 7.0 - 25.0    Anion Gap 4.0 (L) 5.0 - 15.0  mmol/L   Urinalysis With Culture If Indicated - Urine, Catheter In/Out    Specimen: Urine, Catheter In/Out   Result Value Ref Range    Color, UA Other (A) Yellow, Straw    Appearance, UA Turbid (A) Clear    pH, UA 6.0 5.0 - 8.0    Specific Gravity, UA 1.013 1.005 - 1.030    Glucose, UA Negative Negative    Ketones, UA Trace (A) Negative    Bilirubin, UA Negative Negative    Blood, UA Large (3+) (A) Negative    Protein,  mg/dL (2+) (A) Negative    Leuk Esterase, UA Large (3+) (A) Negative    Nitrite, UA Negative Negative    Urobilinogen, UA 0.2 E.U./dL 0.2 - 1.0 E.U./dL   CBC Auto Differential    Specimen: Blood   Result Value Ref Range    WBC 2.50 (L) 3.40 - 10.80 10*3/mm3    RBC 3.27 (L) 3.77 - 5.28 10*6/mm3    Hemoglobin 8.0 (L) 12.0 - 15.9 g/dL    Hematocrit 25.5 (L) 34.0 - 46.6 %    MCV 78.0 (L) 79.0 - 97.0 fL    MCH 24.4 (L) 26.6 - 33.0 pg    MCHC 31.2 (L) 31.5 - 35.7 g/dL    RDW 19.9 (H) 12.3 - 15.4 %    RDW-SD 54.7 (H) 37.0 - 54.0 fl    MPV 7.4 6.0 - 12.0 fL    Platelets 138 (L) 140 - 450 10*3/mm3   Urinalysis, Microscopic Only - Urine, Catheter In/Out    Specimen: Urine, Catheter In/Out   Result Value Ref Range    RBC, UA Too Numerous to Count (A) None Seen /HPF    WBC, UA Too Numerous to Count (A) None Seen /HPF    Bacteria, UA 3+ (A) None Seen /HPF    Squamous Epithelial Cells, UA 0-2 None Seen, 0-2 /HPF    Hyaline Casts, UA None Seen None Seen /LPF    Methodology Manual Light Microscopy    ECG 12 Lead   Result Value Ref Range    QT Interval 352 ms   Light Blue Top   Result Value Ref Range    Extra Tube hold for add-on    Green Top (Gel)   Result Value Ref Range    Extra Tube Hold for add-ons.    Lavender Top   Result Value Ref Range    Extra Tube hold for add-on    Gold Top - SST   Result Value Ref Range    Extra Tube Hold for add-ons.      No radiology results for the last day                                       MDM  Number of Diagnoses or Management Options  Diagnosis management comments:  Patient remained normotensive throughout her ED visit.  Heart rate ranged between 50 and 60 which is her baseline.  Patient does have a UTI.  She has leukopenia although again this is at her baseline.  BMP is unchanged from baseline as well.  Patient was given Rocephin IM.  Patient had pulled her IV out and was being aggressive with the staff.  Patient will be discharged back to her care center and will be placed on antibiotics.       Amount and/or Complexity of Data Reviewed  Clinical lab tests: reviewed  Tests in the medicine section of CPT®: reviewed    Risk of Complications, Morbidity, and/or Mortality  Presenting problems: high  Diagnostic procedures: high  Management options: high    Patient Progress  Patient progress: stable      Final diagnoses:   Altered mental status, unspecified altered mental status type   Urinary tract infection without hematuria, site unspecified            Kodi Tabor MD  12/10/20 8131

## 2020-12-11 LAB — BACTERIA SPEC AEROBE CULT: NO GROWTH

## 2020-12-13 LAB — QT INTERVAL: 352 MS
